# Patient Record
Sex: MALE | Race: WHITE | NOT HISPANIC OR LATINO | Employment: UNEMPLOYED | URBAN - METROPOLITAN AREA
[De-identification: names, ages, dates, MRNs, and addresses within clinical notes are randomized per-mention and may not be internally consistent; named-entity substitution may affect disease eponyms.]

---

## 2021-03-11 ENCOUNTER — HOSPITAL ENCOUNTER (INPATIENT)
Facility: HOSPITAL | Age: 56
LOS: 2 days | Discharge: HOME | End: 2021-03-13
Attending: EMERGENCY MEDICINE | Admitting: FAMILY MEDICINE

## 2021-03-11 ENCOUNTER — APPOINTMENT (OUTPATIENT)
Dept: CT IMAGING | Facility: HOSPITAL | Age: 56
End: 2021-03-11

## 2021-03-11 DIAGNOSIS — K08.89 PAIN, DENTAL: ICD-10-CM

## 2021-03-11 DIAGNOSIS — I16.0 HYPERTENSIVE URGENCY: Primary | ICD-10-CM

## 2021-03-11 DIAGNOSIS — G25.79 SEROTONIN SYNDROME: ICD-10-CM

## 2021-03-11 PROBLEM — F42.9 OCD (OBSESSIVE COMPULSIVE DISORDER): Status: ACTIVE | Noted: 2021-03-11

## 2021-03-11 PROBLEM — I16.9 HYPERTENSIVE CRISIS: Status: ACTIVE | Noted: 2021-03-11

## 2021-03-11 PROBLEM — F41.9 ANXIETY DISORDER: Status: ACTIVE | Noted: 2021-03-11

## 2021-03-11 LAB
ALBUMIN SERPL-MCNC: 4.2 G/DL (ref 3.5–5.2)
ALBUMIN/GLOB SERPL: 1.8 G/DL
ALP SERPL-CCNC: 52 U/L (ref 39–117)
ALT SERPL W P-5'-P-CCNC: 32 U/L (ref 1–41)
ANION GAP SERPL CALCULATED.3IONS-SCNC: 10 MMOL/L (ref 5–15)
AST SERPL-CCNC: 23 U/L (ref 1–40)
BACTERIA UR QL AUTO: ABNORMAL /HPF
BASOPHILS # BLD AUTO: 0.03 10*3/MM3 (ref 0–0.2)
BASOPHILS NFR BLD AUTO: 0.3 % (ref 0–1.5)
BILIRUB SERPL-MCNC: 0.5 MG/DL (ref 0–1.2)
BILIRUB UR QL STRIP: NEGATIVE
BUN SERPL-MCNC: 12 MG/DL (ref 6–20)
BUN/CREAT SERPL: 11.3 (ref 7–25)
CALCIUM SPEC-SCNC: 9.2 MG/DL (ref 8.6–10.5)
CHLORIDE SERPL-SCNC: 104 MMOL/L (ref 98–107)
CLARITY UR: CLEAR
CO2 SERPL-SCNC: 26 MMOL/L (ref 22–29)
COLOR UR: YELLOW
CREAT SERPL-MCNC: 1.06 MG/DL (ref 0.76–1.27)
DEPRECATED RDW RBC AUTO: 41.4 FL (ref 37–54)
EOSINOPHIL # BLD AUTO: 0.03 10*3/MM3 (ref 0–0.4)
EOSINOPHIL NFR BLD AUTO: 0.3 % (ref 0.3–6.2)
ERYTHROCYTE [DISTWIDTH] IN BLOOD BY AUTOMATED COUNT: 13.4 % (ref 12.3–15.4)
FLUAV RNA RESP QL NAA+PROBE: NOT DETECTED
FLUBV RNA RESP QL NAA+PROBE: NOT DETECTED
GFR SERPL CREATININE-BSD FRML MDRD: 73 ML/MIN/1.73
GLOBULIN UR ELPH-MCNC: 2.4 GM/DL
GLUCOSE SERPL-MCNC: 128 MG/DL (ref 65–99)
GLUCOSE UR STRIP-MCNC: NEGATIVE MG/DL
HCT VFR BLD AUTO: 43.6 % (ref 37.5–51)
HGB BLD-MCNC: 15.2 G/DL (ref 13–17.7)
HGB UR QL STRIP.AUTO: ABNORMAL
HYALINE CASTS UR QL AUTO: ABNORMAL /LPF
IMM GRANULOCYTES # BLD AUTO: 0.04 10*3/MM3 (ref 0–0.05)
IMM GRANULOCYTES NFR BLD AUTO: 0.4 % (ref 0–0.5)
KETONES UR QL STRIP: ABNORMAL
LEUKOCYTE ESTERASE UR QL STRIP.AUTO: NEGATIVE
LYMPHOCYTES # BLD AUTO: 1.01 10*3/MM3 (ref 0.7–3.1)
LYMPHOCYTES NFR BLD AUTO: 11.1 % (ref 19.6–45.3)
MCH RBC QN AUTO: 29.6 PG (ref 26.6–33)
MCHC RBC AUTO-ENTMCNC: 34.9 G/DL (ref 31.5–35.7)
MCV RBC AUTO: 84.8 FL (ref 79–97)
MONOCYTES # BLD AUTO: 0.46 10*3/MM3 (ref 0.1–0.9)
MONOCYTES NFR BLD AUTO: 5.1 % (ref 5–12)
NEUTROPHILS NFR BLD AUTO: 7.51 10*3/MM3 (ref 1.7–7)
NEUTROPHILS NFR BLD AUTO: 82.8 % (ref 42.7–76)
NITRITE UR QL STRIP: NEGATIVE
NRBC BLD AUTO-RTO: 0 /100 WBC (ref 0–0.2)
PH UR STRIP.AUTO: 7 [PH] (ref 5–8)
PLATELET # BLD AUTO: 217 10*3/MM3 (ref 140–450)
PMV BLD AUTO: 8.9 FL (ref 6–12)
POTASSIUM SERPL-SCNC: 3.8 MMOL/L (ref 3.5–5.2)
PROT SERPL-MCNC: 6.6 G/DL (ref 6–8.5)
PROT UR QL STRIP: NEGATIVE
RBC # BLD AUTO: 5.14 10*6/MM3 (ref 4.14–5.8)
RBC # UR: ABNORMAL /HPF
REF LAB TEST METHOD: ABNORMAL
SARS-COV-2 RNA RESP QL NAA+PROBE: NOT DETECTED
SODIUM SERPL-SCNC: 140 MMOL/L (ref 136–145)
SP GR UR STRIP: 1.01 (ref 1–1.03)
SQUAMOUS #/AREA URNS HPF: ABNORMAL /HPF
TROPONIN T SERPL-MCNC: <0.01 NG/ML (ref 0–0.03)
TROPONIN T SERPL-MCNC: <0.01 NG/ML (ref 0–0.03)
UROBILINOGEN UR QL STRIP: ABNORMAL
WBC # BLD AUTO: 9.08 10*3/MM3 (ref 3.4–10.8)
WBC UR QL AUTO: ABNORMAL /HPF

## 2021-03-11 PROCEDURE — 87636 SARSCOV2 & INF A&B AMP PRB: CPT | Performed by: PHYSICIAN ASSISTANT

## 2021-03-11 PROCEDURE — 25010000002 ONDANSETRON PER 1 MG: Performed by: PHYSICIAN ASSISTANT

## 2021-03-11 PROCEDURE — 25010000002 HYDROMORPHONE PER 4 MG: Performed by: INTERNAL MEDICINE

## 2021-03-11 PROCEDURE — 99223 1ST HOSP IP/OBS HIGH 75: CPT | Performed by: INTERNAL MEDICINE

## 2021-03-11 PROCEDURE — 81001 URINALYSIS AUTO W/SCOPE: CPT | Performed by: PHYSICIAN ASSISTANT

## 2021-03-11 PROCEDURE — 25010000002 HYDROMORPHONE 1 MG/ML SOLUTION: Performed by: EMERGENCY MEDICINE

## 2021-03-11 PROCEDURE — 84484 ASSAY OF TROPONIN QUANT: CPT | Performed by: PHYSICIAN ASSISTANT

## 2021-03-11 PROCEDURE — 99285 EMERGENCY DEPT VISIT HI MDM: CPT

## 2021-03-11 PROCEDURE — 70486 CT MAXILLOFACIAL W/O DYE: CPT

## 2021-03-11 PROCEDURE — 93005 ELECTROCARDIOGRAM TRACING: CPT | Performed by: PHYSICIAN ASSISTANT

## 2021-03-11 PROCEDURE — 51798 US URINE CAPACITY MEASURE: CPT

## 2021-03-11 PROCEDURE — 85025 COMPLETE CBC W/AUTO DIFF WBC: CPT | Performed by: PHYSICIAN ASSISTANT

## 2021-03-11 PROCEDURE — 80053 COMPREHEN METABOLIC PANEL: CPT | Performed by: PHYSICIAN ASSISTANT

## 2021-03-11 RX ORDER — TAMSULOSIN HYDROCHLORIDE 0.4 MG/1
0.4 CAPSULE ORAL DAILY
Status: DISCONTINUED | OUTPATIENT
Start: 2021-03-11 | End: 2021-03-13 | Stop reason: HOSPADM

## 2021-03-11 RX ORDER — AMLODIPINE BESYLATE 5 MG/1
5 TABLET ORAL
Status: DISCONTINUED | OUTPATIENT
Start: 2021-03-11 | End: 2021-03-11

## 2021-03-11 RX ORDER — AMLODIPINE BESYLATE 10 MG/1
10 TABLET ORAL
Status: DISCONTINUED | OUTPATIENT
Start: 2021-03-12 | End: 2021-03-12

## 2021-03-11 RX ORDER — NALOXONE HCL 0.4 MG/ML
0.4 VIAL (ML) INJECTION
Status: DISCONTINUED | OUTPATIENT
Start: 2021-03-11 | End: 2021-03-12

## 2021-03-11 RX ORDER — SODIUM CHLORIDE 0.9 % (FLUSH) 0.9 %
10 SYRINGE (ML) INJECTION AS NEEDED
Status: DISCONTINUED | OUTPATIENT
Start: 2021-03-11 | End: 2021-03-13 | Stop reason: HOSPADM

## 2021-03-11 RX ORDER — HYDROCODONE BITARTRATE AND ACETAMINOPHEN 5; 325 MG/1; MG/1
1 TABLET ORAL EVERY 4 HOURS PRN
Status: DISCONTINUED | OUTPATIENT
Start: 2021-03-11 | End: 2021-03-13 | Stop reason: HOSPADM

## 2021-03-11 RX ORDER — ONDANSETRON 2 MG/ML
4 INJECTION INTRAMUSCULAR; INTRAVENOUS ONCE
Status: COMPLETED | OUTPATIENT
Start: 2021-03-11 | End: 2021-03-11

## 2021-03-11 RX ORDER — TAMSULOSIN HYDROCHLORIDE 0.4 MG/1
0.4 CAPSULE ORAL ONCE
Status: COMPLETED | OUTPATIENT
Start: 2021-03-11 | End: 2021-03-11

## 2021-03-11 RX ORDER — SODIUM CHLORIDE 0.9 % (FLUSH) 0.9 %
10 SYRINGE (ML) INJECTION EVERY 12 HOURS SCHEDULED
Status: DISCONTINUED | OUTPATIENT
Start: 2021-03-11 | End: 2021-03-13 | Stop reason: HOSPADM

## 2021-03-11 RX ORDER — LORAZEPAM 0.5 MG/1
0.5 TABLET ORAL EVERY 8 HOURS PRN
Status: DISCONTINUED | OUTPATIENT
Start: 2021-03-11 | End: 2021-03-13 | Stop reason: HOSPADM

## 2021-03-11 RX ORDER — HYDROCHLOROTHIAZIDE 12.5 MG/1
12.5 CAPSULE, GELATIN COATED ORAL 2 TIMES DAILY
Status: DISCONTINUED | OUTPATIENT
Start: 2021-03-11 | End: 2021-03-12

## 2021-03-11 RX ORDER — ESCITALOPRAM OXALATE 10 MG/1
10 TABLET ORAL DAILY
COMMUNITY
End: 2021-03-22

## 2021-03-11 RX ORDER — LABETALOL HYDROCHLORIDE 5 MG/ML
10 INJECTION, SOLUTION INTRAVENOUS ONCE
Status: COMPLETED | OUTPATIENT
Start: 2021-03-11 | End: 2021-03-11

## 2021-03-11 RX ORDER — VENLAFAXINE 75 MG/1
75 TABLET ORAL 2 TIMES DAILY
COMMUNITY

## 2021-03-11 RX ORDER — ONDANSETRON 2 MG/ML
4 INJECTION INTRAMUSCULAR; INTRAVENOUS EVERY 6 HOURS PRN
Status: DISCONTINUED | OUTPATIENT
Start: 2021-03-11 | End: 2021-03-13 | Stop reason: HOSPADM

## 2021-03-11 RX ORDER — HYDROMORPHONE HYDROCHLORIDE 1 MG/ML
0.5 INJECTION, SOLUTION INTRAMUSCULAR; INTRAVENOUS; SUBCUTANEOUS
Status: DISCONTINUED | OUTPATIENT
Start: 2021-03-11 | End: 2021-03-12

## 2021-03-11 RX ADMIN — HYDROCODONE BITARTRATE AND ACETAMINOPHEN 1 TABLET: 5; 325 TABLET ORAL at 20:06

## 2021-03-11 RX ADMIN — NICARDIPINE HYDROCHLORIDE 12.5 MG/HR: 0.1 INJECTION, SOLUTION INTRAVENOUS at 20:21

## 2021-03-11 RX ADMIN — SODIUM CHLORIDE, PRESERVATIVE FREE 10 ML: 5 INJECTION INTRAVENOUS at 20:29

## 2021-03-11 RX ADMIN — HYDROMORPHONE HYDROCHLORIDE 1 MG: 1 INJECTION, SOLUTION INTRAMUSCULAR; INTRAVENOUS; SUBCUTANEOUS at 15:24

## 2021-03-11 RX ADMIN — LABETALOL 20 MG/4 ML (5 MG/ML) INTRAVENOUS SYRINGE 10 MG: at 15:24

## 2021-03-11 RX ADMIN — HYDROCHLOROTHIAZIDE 12.5 MG: 12.5 CAPSULE ORAL at 20:28

## 2021-03-11 RX ADMIN — ONDANSETRON 4 MG: 2 INJECTION INTRAMUSCULAR; INTRAVENOUS at 14:22

## 2021-03-11 RX ADMIN — HYDROMORPHONE HYDROCHLORIDE 1 MG: 1 INJECTION, SOLUTION INTRAMUSCULAR; INTRAVENOUS; SUBCUTANEOUS at 14:23

## 2021-03-11 RX ADMIN — NICARDIPINE HYDROCHLORIDE 5 MG/HR: 0.1 INJECTION, SOLUTION INTRAVENOUS at 16:26

## 2021-03-11 RX ADMIN — SODIUM CHLORIDE, PRESERVATIVE FREE 10 ML: 5 INJECTION INTRAVENOUS at 20:28

## 2021-03-11 RX ADMIN — LORAZEPAM 0.5 MG: 0.5 TABLET ORAL at 20:06

## 2021-03-11 RX ADMIN — NICARDIPINE HYDROCHLORIDE 12.5 MG/HR: 0.1 INJECTION, SOLUTION INTRAVENOUS at 22:05

## 2021-03-11 RX ADMIN — HYDROMORPHONE HYDROCHLORIDE 0.5 MG: 1 INJECTION, SOLUTION INTRAMUSCULAR; INTRAVENOUS; SUBCUTANEOUS at 23:25

## 2021-03-11 RX ADMIN — NICARDIPINE HYDROCHLORIDE 5 MG/HR: 0.1 INJECTION, SOLUTION INTRAVENOUS at 23:49

## 2021-03-11 RX ADMIN — TAMSULOSIN HYDROCHLORIDE 0.4 MG: 0.4 CAPSULE ORAL at 16:26

## 2021-03-11 RX ADMIN — HYDROMORPHONE HYDROCHLORIDE 0.5 MG: 1 INJECTION, SOLUTION INTRAMUSCULAR; INTRAVENOUS; SUBCUTANEOUS at 20:06

## 2021-03-11 RX ADMIN — AMLODIPINE BESYLATE 5 MG: 5 TABLET ORAL at 14:22

## 2021-03-11 NOTE — NURSING NOTE
"Pt arrived from ED via WC. A&O x's 4. BP  Elevated. Manual /120. Cardene gtt at 15. Pt asking for pain meds r/t tooth pain also reports he cannot void and has had this problem x's 2 weeks. When ask if he mentioned this in the ED he replied \"yes\". UA has been sent. VS noted. CO pain of 9/10. Educated on use of call bell and need to call for assistance and not get up without calling.Bed locked in low position. Pt needs reinforcement. Continue to monitor.   "

## 2021-03-11 NOTE — ED PROVIDER NOTES
EMERGENCY DEPARTMENT ENCOUNTER    Pt Name: Jesus Soria  MRN: [id number]  Pt :   1965  Room Number:    Date of encounter:  3/11/2021  PCP: Provider, No Known  ED Provider: Timothy Grimm PA-C    Historian: Patient      HPI:  Chief Complaint: Right jaw pain        Context: Jesus Soria is a 55 y.o. male who presents to the ED c/o 1 week history of worsening right upper jaw pain.  Patient has only one molar remaining on that side, and he feels throbbing 8 out of 10 pain underneath this tooth.  No fevers chills sweats or trismus.  He states he normally wears a bridge.  Also now complaining of slight pain to the right lower molar as well.  No neck pain, no difficulty controlling secretions.  No prior history of heart disease.  He was recently started on Lexapro, and has been taking this for 5 days, he states this causes his heart rate to go up and causes him to sweat profusely.  He has continued to take his Effexor while he is taking Lexapro.  He is hypertensive today, and he does have a history of hypertension. He has not taken his Norvasc 5 mg today.  He denies shortness of breath palpitations arm neck shoulder or chest pain.  No mental status changes confusion hallucinations or seizures.  No known history of cardiac disease otherwise.      PAST MEDICAL HISTORY  Active Ambulatory Problems     Diagnosis Date Noted   • No Active Ambulatory Problems     Resolved Ambulatory Problems     Diagnosis Date Noted   • No Resolved Ambulatory Problems     Past Medical History:   Diagnosis Date   • Hypertension    • OCD (obsessive compulsive disorder)          PAST SURGICAL HISTORY  History reviewed. No pertinent surgical history.      FAMILY HISTORY  History reviewed. No pertinent family history.      SOCIAL HISTORY  Social History     Socioeconomic History   • Marital status: Single     Spouse name: Not on file   • Number of children: Not on file   • Years of education: Not on file   • Highest education  level: Not on file   Tobacco Use   • Smoking status: Unknown If Ever Smoked         ALLERGIES  Amoxicillin        REVIEW OF SYSTEMS  Review of Systems   Constitutional: Positive for diaphoresis. Negative for activity change, appetite change, fatigue and fever.   HENT: Positive for dental problem. Negative for congestion, nosebleeds, rhinorrhea and sore throat.    Eyes: Negative for photophobia and visual disturbance.   Respiratory: Negative for cough, shortness of breath and wheezing.    Cardiovascular: Negative for chest pain and palpitations.   Gastrointestinal: Negative for abdominal pain, nausea and vomiting.   Genitourinary: Positive for difficulty urinating. Negative for dysuria and hematuria.   Musculoskeletal: Negative for arthralgias, back pain, myalgias and neck pain.   Neurological: Negative for dizziness, seizures, syncope and headaches.   All other systems reviewed and are negative.    All systems reviewed and negative except for those discussed in HPI.       PHYSICAL EXAM    I have reviewed the triage vital signs and nursing notes.    ED Triage Vitals   Temp Heart Rate Resp BP SpO2   03/11/21 1347 03/11/21 1342 03/11/21 1342 03/11/21 1342 03/11/21 1342   98.1 °F (36.7 °C) 113 20 (!) 238/156 96 %      Temp src Heart Rate Source Patient Position BP Location FiO2 (%)   03/11/21 1347 03/11/21 1342 03/11/21 1342 03/11/21 1342 --   Oral Monitor Sitting Left arm        Physical Exam  GENERAL:   Pale, clammy, diaphoretic, heart rate 120 and regular.  Hypertensive, not in acute distress.  Afebrile, oral temp 98.1 at triage.  HENT: Nares patent.  Mucous membranes are dry.  EYES: No scleral icterus.  CV: Regular rhythm, rate 120  RESPIRATORY: Normal effort.  No audible wheezes, rales or rhonchi.  ABDOMEN: Soft, nontender.  MUSCULOSKELETAL: No deformities.  He is hyperreflexic, but no myoclonus noted.   NEURO: Alert, moves all extremities, follows commands.  Hyperreflexic but no myoclonus.  Slight tremor noted.   Cranial nerves grossly intact, no gross sensorimotor deficits.  Proprioception is normal.  SKIN: Warm, dry, no rash visualized.        LAB RESULTS  Recent Results (from the past 24 hour(s))   CBC Auto Differential    Collection Time: 03/11/21  3:11 PM    Specimen: Blood   Result Value Ref Range    WBC 9.08 3.40 - 10.80 10*3/mm3    RBC 5.14 4.14 - 5.80 10*6/mm3    Hemoglobin 15.2 13.0 - 17.7 g/dL    Hematocrit 43.6 37.5 - 51.0 %    MCV 84.8 79.0 - 97.0 fL    MCH 29.6 26.6 - 33.0 pg    MCHC 34.9 31.5 - 35.7 g/dL    RDW 13.4 12.3 - 15.4 %    RDW-SD 41.4 37.0 - 54.0 fl    MPV 8.9 6.0 - 12.0 fL    Platelets 217 140 - 450 10*3/mm3    Neutrophil % 82.8 (H) 42.7 - 76.0 %    Lymphocyte % 11.1 (L) 19.6 - 45.3 %    Monocyte % 5.1 5.0 - 12.0 %    Eosinophil % 0.3 0.3 - 6.2 %    Basophil % 0.3 0.0 - 1.5 %    Immature Grans % 0.4 0.0 - 0.5 %    Neutrophils, Absolute 7.51 (H) 1.70 - 7.00 10*3/mm3    Lymphocytes, Absolute 1.01 0.70 - 3.10 10*3/mm3    Monocytes, Absolute 0.46 0.10 - 0.90 10*3/mm3    Eosinophils, Absolute 0.03 0.00 - 0.40 10*3/mm3    Basophils, Absolute 0.03 0.00 - 0.20 10*3/mm3    Immature Grans, Absolute 0.04 0.00 - 0.05 10*3/mm3    nRBC 0.0 0.0 - 0.2 /100 WBC   Troponin    Collection Time: 03/11/21  3:11 PM    Specimen: Blood   Result Value Ref Range    Troponin T <0.010 0.000 - 0.030 ng/mL   Comprehensive Metabolic Panel    Collection Time: 03/11/21  3:11 PM    Specimen: Blood   Result Value Ref Range    Glucose 128 (H) 65 - 99 mg/dL    BUN 12 6 - 20 mg/dL    Creatinine 1.06 0.76 - 1.27 mg/dL    Sodium 140 136 - 145 mmol/L    Potassium 3.8 3.5 - 5.2 mmol/L    Chloride 104 98 - 107 mmol/L    CO2 26.0 22.0 - 29.0 mmol/L    Calcium 9.2 8.6 - 10.5 mg/dL    Total Protein 6.6 6.0 - 8.5 g/dL    Albumin 4.20 3.50 - 5.20 g/dL    ALT (SGPT) 32 1 - 41 U/L    AST (SGOT) 23 1 - 40 U/L    Alkaline Phosphatase 52 39 - 117 U/L    Total Bilirubin 0.5 0.0 - 1.2 mg/dL    eGFR Non African Amer 73 >60 mL/min/1.73    Globulin 2.4  gm/dL    A/G Ratio 1.8 g/dL    BUN/Creatinine Ratio 11.3 7.0 - 25.0    Anion Gap 10.0 5.0 - 15.0 mmol/L   Urinalysis With Microscopic If Indicated (No Culture) - Urine, Clean Catch    Collection Time: 03/11/21  4:25 PM    Specimen: Urine, Clean Catch   Result Value Ref Range    Color, UA Yellow Yellow, Straw    Appearance, UA Clear Clear    pH, UA 7.0 5.0 - 8.0    Specific Gravity, UA 1.010 1.005 - 1.030    Glucose, UA Negative Negative    Ketones, UA Trace (A) Negative    Bilirubin, UA Negative Negative    Blood, UA Trace (A) Negative    Protein, UA Negative Negative    Leuk Esterase, UA Negative Negative    Nitrite, UA Negative Negative    Urobilinogen, UA 0.2 E.U./dL 0.2 - 1.0 E.U./dL   Urinalysis, Microscopic Only - Urine, Clean Catch    Collection Time: 03/11/21  4:25 PM    Specimen: Urine, Clean Catch   Result Value Ref Range    RBC, UA 3-6 (A) None Seen, 0-2 /HPF    WBC, UA None Seen None Seen, 0-2 /HPF    Bacteria, UA None Seen None Seen, Trace /HPF    Squamous Epithelial Cells, UA 0-2 None Seen, 0-2 /HPF    Hyaline Casts, UA 0-6 0 - 6 /LPF    Methodology Manual Light Microscopy        If labs were ordered, I independently reviewed the results.        RADIOLOGY  CT Maxillofacial Without Contrast    Result Date: 3/11/2021  EXAMINATION: CT MAXILLOFACIAL WO CONT-  INDICATION: R upper molar dental pain, evaluate for abscess  TECHNIQUE: Axial noncontrast CT of the face with multiplanar reconstruction  The radiation dose reduction device was turned on for each scan per the ALARA (As Low as Reasonably Achievable) protocol.  COMPARISON: NONE  FINDINGS: Limited intracranial evaluation demonstrates no acute findings. The orbits are normal and the paranasal sinuses demonstrates mucous retention cysts in the maxillary sinuses. The superficial facial soft tissues are unremarkable. No acute facial bone fracture. The mastoid air cells are clear. Odontogenic disease is present with areas of caries formation, without  evidence of distinct periapical lucency or adjacent lingual or buccal surface of the odontogenic abscess.      Numerous dental caries are present, without evidence of significant periapical lucency or adjacent odontogenic abscess.   This report was finalized on 3/11/2021 4:18 PM by Mitchel Norman.        See radiologist's dictation for official interpretation.        PROCEDURES    Procedures    ECG 12 Lead         ECG 12 Lead    (Results Pending)       MEDICATIONS GIVEN IN ER    Medications   sodium chloride 0.9 % flush 10 mL (has no administration in time range)   amLODIPine (NORVASC) tablet 5 mg (5 mg Oral Given 3/11/21 1422)   niCARdipine (CARDENE) 20 mg in 200 mL NS infusion (7.5 mg/hr Intravenous Rate/Dose Change 3/11/21 1650)   ondansetron (ZOFRAN) injection 4 mg (4 mg Intravenous Given 3/11/21 1422)   HYDROmorphone (DILAUDID) injection 1 mg (1 mg Intravenous Given 3/11/21 1423)   labetalol (NORMODYNE,TRANDATE) injection 10 mg (10 mg Intravenous Given 3/11/21 1524)   HYDROmorphone (DILAUDID) injection 1 mg (1 mg Intravenous Given 3/11/21 1524)   tamsulosin (FLOMAX) 24 hr capsule 0.4 mg (0.4 mg Oral Given 3/11/21 1626)         PROGRESS, DATA ANALYSIS, CONSULTS, AND MEDICAL DECISION MAKING    All labs have been independently reviewed by me.  All radiology studies have been reviewed by me and the radiologist dictating the report.   EKG's have been independently viewed and interpreted by me.                 Patient has findings consistent with serotonin syndrome.  Discussed with Dr. Maddox who accepts patient to hospitalist service.      AS OF 17:40 EST VITALS:    BP - (!) 221/148  HR - 99  TEMP - 98.1 °F (36.7 °C) (Oral)  O2 SATS - 94%        DIAGNOSIS  Final diagnoses:   Hypertensive urgency   Serotonin syndrome         DISPOSITION  Admit to hospitalist service         Timothy Grimm PA-C  03/11/21 1739       Timothy Grimm PA-C  03/11/21 1740

## 2021-03-11 NOTE — H&P
[facility] Medicine Services  HISTORY AND PHYSICAL    Patient Name: Jesus Soria  : 1965  MRN: [id number]  Primary Care Physician: Obdulio, No Known  Date of admission: 3/11/2021      Subjective   Subjective     Chief Complaint: Toothache, sweats    HPI:  Jesus Soria is a 55 y.o. male with history of poorly controlled hypertension, OCD, anxiety, ongoing right upper dental pain.  Patient presents with worsening pain and sweats.  On arrival here patient significantly hypertensive with /157, also tachycardic, tachypneic and clammy.  Patient does mention that he has been on Effexor for years, but recently started on Lexapro for which he has taken for the last 5 days.  He is to follow-up with dental surgeon at  but he is unable to obtain an earlier appointment.  At the time of my elevation pain is much improved, BP remains high while on Cardene gtt.  He denies any SOA, fevers or chills, no nausea no vomiting, does endorse sweats.    COVID Details: [] No Symptoms  Symptoms: [] Fever []  Cough [] Shortness of breath [] Change in taste or smell  Risks:  [] Direct Exposure [] High risk facility   Date of Onset:  ____  Date of first positive COVID test:     Review of Systems   Gen- No fevers, chills  CV- No chest pain, palpitations  Resp- No cough, dyspnea  GI- No N/V/D, abd pain    All other systems reviewed and are negative.     Personal History     Past Medical History:   Diagnosis Date   • Hypertension    • OCD (obsessive compulsive disorder)        History reviewed. No pertinent surgical history.    Family History: family history is not on file. Otherwise pertinent FHx was reviewed and unremarkable.     Social History:    Social History     Social History Narrative   • Not on file       Medications:  Available home medication information reviewed.  (Not in a hospital admission)      Allergies   Allergen Reactions   • Amoxicillin Hives       Objective   Objective     Vital  Signs:   Temp:  [98.1 °F (36.7 °C)] 98.1 °F (36.7 °C)  Heart Rate:  [] 110  Resp:  [20] 20  BP: (214-239)/(134-178) 223/146       Physical Exam   Constitutional: Chronically ill-appearing male, restless awake, alert  Eyes: PERRLA, sclerae anicteric, no conjunctival injection  HENT: NCAT, mucous membranes moist  Neck: Supple, no thyromegaly, no lymphadenopathy, trachea midline  Respiratory: Clear to auscultation bilaterally, nonlabored respirations   Cardiovascular: RRR, no murmurs, rubs, or gallops, palpable pedal pulses bilaterally  Gastrointestinal: Positive bowel sounds, soft, nontender, nondistended  Musculoskeletal: No bilateral ankle edema, no clubbing or cyanosis to extremities  Psychiatric: Appropriate affect, cooperative  Neurologic: Oriented x 3, strength symmetric in all extremities, Cranial Nerves grossly intact to confrontation, speech clear  Skin: No rashes    Result Review:  I have personally reviewed the results from the time of this admission to 03/11/21 5:41 PM EST and agree with these findings:  []  Laboratory  []  Microbiology  []  Radiology  []  EKG/Telemetry   []  Cardiology/Vascular   []  Pathology  []  Old records  []  Other:  Most notable findings include:       LAB RESULTS:      Lab 03/11/21  1511   WBC 9.08   HEMOGLOBIN 15.2   HEMATOCRIT 43.6   PLATELETS 217   NEUTROS ABS 7.51*   IMMATURE GRANS (ABS) 0.04   LYMPHS ABS 1.01   MONOS ABS 0.46   EOS ABS 0.03   MCV 84.8         Lab 03/11/21  1511   SODIUM 140   POTASSIUM 3.8   CHLORIDE 104   CO2 26.0   ANION GAP 10.0   BUN 12   CREATININE 1.06   GLUCOSE 128*   CALCIUM 9.2         Lab 03/11/21  1511   TOTAL PROTEIN 6.6   ALBUMIN 4.20   GLOBULIN 2.4   ALT (SGPT) 32   AST (SGOT) 23   BILIRUBIN 0.5   ALK PHOS 52         Lab 03/11/21  1511   TROPONIN T <0.010                 Brief Urine Lab Results  (Last result in the past 365 days)      Color   Clarity   Blood   Leuk Est   Nitrite   Protein   CREAT   Urine HCG        03/11/21 1625 Yellow  Clear Trace Negative Negative Negative             Microbiology Results (last 10 days)     ** No results found for the last 240 hours. **          CT Maxillofacial Without Contrast    Result Date: 3/11/2021  EXAMINATION: CT MAXILLOFACIAL WO CONT-  INDICATION: R upper molar dental pain, evaluate for abscess  TECHNIQUE: Axial noncontrast CT of the face with multiplanar reconstruction  The radiation dose reduction device was turned on for each scan per the ALARA (As Low as Reasonably Achievable) protocol.  COMPARISON: NONE  FINDINGS: Limited intracranial evaluation demonstrates no acute findings. The orbits are normal and the paranasal sinuses demonstrates mucous retention cysts in the maxillary sinuses. The superficial facial soft tissues are unremarkable. No acute facial bone fracture. The mastoid air cells are clear. Odontogenic disease is present with areas of caries formation, without evidence of distinct periapical lucency or adjacent lingual or buccal surface of the odontogenic abscess.      Impression: Numerous dental caries are present, without evidence of significant periapical lucency or adjacent odontogenic abscess.   This report was finalized on 3/11/2021 4:18 PM by Mitchel Normna.            Assessment/Plan   Assessment & Plan     Active Hospital Problems    Diagnosis  POA   • Hypertensive crisis [I16.9]  Yes   • OCD (obsessive compulsive disorder) [F42.9]  Yes   • Anxiety disorder [F41.9]  Yes   • Pain, dental [K08.89]  Yes       55-year-old male with history of OCD, anxiety and hypertension, presents with worsening dental pain, admitted with hypertensive crisis    Hypertensive crisis  -BP significantly elevated on presentation, remains elevated and maxed on Cardene gtt.  -Dental pain could be contributing, also patient suspect to be having serotonin syndrome  -Continue Cardene gtt., will start HCTZ, if remains uncontrolled may consider clonidine, control pain  -Follow-up COVID-19 screening  test.    Suspected serotonin syndrome  -Patient has been on Effexor for years, recently started on Lexapro and has been taking both the last 5 days  -We will hold both medications now, start some benzos, continue supportive therapy    OCD, anxiety disorder  -As needed benzos    Dental pain  -Patient is due to see dental surgeon at , unable to get an appointment yet  -Continue controlpain    BPH-continue Flomax    DVT prophylaxis: Mechanical      CODE STATUS: Full  There are no questions and answers to display.       Admission Status:  I believe this patient meets INPATIENT status due to hypertensive crisis.  I feel patient’s risk for adverse outcomes and need for care warrant INPATIENT evaluation and I predict the patient’s care encounter to likely last beyond 2 midnights.    Lindsay Maddox MD  03/11/21

## 2021-03-12 PROBLEM — F10.20 ALCOHOLISM (HCC): Status: ACTIVE | Noted: 2021-03-12

## 2021-03-12 LAB
GLUCOSE BLDC GLUCOMTR-MCNC: 106 MG/DL (ref 70–130)
GLUCOSE BLDC GLUCOMTR-MCNC: 133 MG/DL (ref 70–130)

## 2021-03-12 PROCEDURE — 99232 SBSQ HOSP IP/OBS MODERATE 35: CPT | Performed by: FAMILY MEDICINE

## 2021-03-12 PROCEDURE — 82962 GLUCOSE BLOOD TEST: CPT

## 2021-03-12 PROCEDURE — 25010000002 LORAZEPAM PER 2 MG: Performed by: INTERNAL MEDICINE

## 2021-03-12 RX ORDER — FOLIC ACID 1 MG/1
1 TABLET ORAL DAILY
Status: DISCONTINUED | OUTPATIENT
Start: 2021-03-12 | End: 2021-03-13 | Stop reason: HOSPADM

## 2021-03-12 RX ORDER — L.ACID,PARA/B.BIFIDUM/S.THERM 8B CELL
1 CAPSULE ORAL
Status: DISCONTINUED | OUTPATIENT
Start: 2021-03-12 | End: 2021-03-13 | Stop reason: HOSPADM

## 2021-03-12 RX ORDER — HYDRALAZINE HYDROCHLORIDE 25 MG/1
25 TABLET, FILM COATED ORAL 3 TIMES DAILY PRN
Status: DISCONTINUED | OUTPATIENT
Start: 2021-03-12 | End: 2021-03-13 | Stop reason: HOSPADM

## 2021-03-12 RX ORDER — NIFEDIPINE 10 MG/1
10 CAPSULE ORAL EVERY 8 HOURS SCHEDULED
Status: DISCONTINUED | OUTPATIENT
Start: 2021-03-12 | End: 2021-03-13 | Stop reason: HOSPADM

## 2021-03-12 RX ORDER — LORAZEPAM 2 MG/ML
2 INJECTION INTRAMUSCULAR
Status: DISCONTINUED | OUTPATIENT
Start: 2021-03-12 | End: 2021-03-13 | Stop reason: HOSPADM

## 2021-03-12 RX ORDER — LORAZEPAM 1 MG/1
2 TABLET ORAL
Status: DISCONTINUED | OUTPATIENT
Start: 2021-03-12 | End: 2021-03-13 | Stop reason: HOSPADM

## 2021-03-12 RX ORDER — ERGOCALCIFEROL (VITAMIN D2) 10 MCG
400 TABLET ORAL DAILY
COMMUNITY

## 2021-03-12 RX ORDER — CHOLECALCIFEROL (VITAMIN D3) 125 MCG
500 CAPSULE ORAL DAILY
COMMUNITY

## 2021-03-12 RX ORDER — AMOXICILLIN AND CLAVULANATE POTASSIUM 875; 125 MG/1; MG/1
1 TABLET, FILM COATED ORAL EVERY 12 HOURS SCHEDULED
Status: DISCONTINUED | OUTPATIENT
Start: 2021-03-12 | End: 2021-03-12

## 2021-03-12 RX ORDER — LORAZEPAM 2 MG/ML
4 INJECTION INTRAMUSCULAR
Status: DISCONTINUED | OUTPATIENT
Start: 2021-03-12 | End: 2021-03-13 | Stop reason: HOSPADM

## 2021-03-12 RX ORDER — AZITHROMYCIN 250 MG/1
250 TABLET, FILM COATED ORAL
Status: DISCONTINUED | OUTPATIENT
Start: 2021-03-13 | End: 2021-03-13 | Stop reason: HOSPADM

## 2021-03-12 RX ORDER — LISINOPRIL 20 MG/1
20 TABLET ORAL
Status: DISCONTINUED | OUTPATIENT
Start: 2021-03-12 | End: 2021-03-13 | Stop reason: HOSPADM

## 2021-03-12 RX ORDER — PROPRANOLOL HYDROCHLORIDE 10 MG/1
20 TABLET ORAL 3 TIMES DAILY
Status: DISCONTINUED | OUTPATIENT
Start: 2021-03-12 | End: 2021-03-13 | Stop reason: HOSPADM

## 2021-03-12 RX ORDER — MIRTAZAPINE 15 MG/1
15 TABLET, FILM COATED ORAL NIGHTLY
Status: DISCONTINUED | OUTPATIENT
Start: 2021-03-12 | End: 2021-03-13 | Stop reason: HOSPADM

## 2021-03-12 RX ORDER — MIRTAZAPINE 15 MG/1
15 TABLET, FILM COATED ORAL NIGHTLY
COMMUNITY

## 2021-03-12 RX ORDER — AZITHROMYCIN 250 MG/1
500 TABLET, FILM COATED ORAL ONCE
Status: COMPLETED | OUTPATIENT
Start: 2021-03-12 | End: 2021-03-12

## 2021-03-12 RX ORDER — LORAZEPAM 2 MG/ML
1 INJECTION INTRAMUSCULAR
Status: DISCONTINUED | OUTPATIENT
Start: 2021-03-12 | End: 2021-03-13 | Stop reason: HOSPADM

## 2021-03-12 RX ORDER — LORAZEPAM 1 MG/1
4 TABLET ORAL
Status: DISCONTINUED | OUTPATIENT
Start: 2021-03-12 | End: 2021-03-13 | Stop reason: HOSPADM

## 2021-03-12 RX ORDER — HYDROCHLOROTHIAZIDE 25 MG/1
25 TABLET ORAL DAILY
Status: DISCONTINUED | OUTPATIENT
Start: 2021-03-12 | End: 2021-03-13 | Stop reason: HOSPADM

## 2021-03-12 RX ORDER — PROPRANOLOL HYDROCHLORIDE 20 MG/1
20 TABLET ORAL 3 TIMES DAILY
COMMUNITY
End: 2021-03-23

## 2021-03-12 RX ORDER — LORAZEPAM 1 MG/1
1 TABLET ORAL
Status: DISCONTINUED | OUTPATIENT
Start: 2021-03-12 | End: 2021-03-13 | Stop reason: HOSPADM

## 2021-03-12 RX ORDER — HYDROCHLOROTHIAZIDE 25 MG/1
25 TABLET ORAL DAILY
Status: DISCONTINUED | OUTPATIENT
Start: 2021-03-13 | End: 2021-03-12

## 2021-03-12 RX ADMIN — MIRTAZAPINE 15 MG: 15 TABLET, FILM COATED ORAL at 20:35

## 2021-03-12 RX ADMIN — LISINOPRIL 20 MG: 20 TABLET ORAL at 12:03

## 2021-03-12 RX ADMIN — TAMSULOSIN HYDROCHLORIDE 0.4 MG: 0.4 CAPSULE ORAL at 12:03

## 2021-03-12 RX ADMIN — THIAMINE HCL TAB 100 MG 100 MG: 100 TAB at 12:03

## 2021-03-12 RX ADMIN — HYDROCHLOROTHIAZIDE 25 MG: 25 TABLET ORAL at 12:04

## 2021-03-12 RX ADMIN — SODIUM CHLORIDE, PRESERVATIVE FREE 10 ML: 5 INJECTION INTRAVENOUS at 20:36

## 2021-03-12 RX ADMIN — NIFEDIPINE 10 MG: 10 CAPSULE ORAL at 15:35

## 2021-03-12 RX ADMIN — LORAZEPAM 2 MG: 2 INJECTION INTRAMUSCULAR; INTRAVENOUS at 02:56

## 2021-03-12 RX ADMIN — NICARDIPINE HYDROCHLORIDE 5 MG/HR: 0.1 INJECTION, SOLUTION INTRAVENOUS at 08:29

## 2021-03-12 RX ADMIN — LORAZEPAM 1 MG: 2 INJECTION INTRAMUSCULAR; INTRAVENOUS at 06:12

## 2021-03-12 RX ADMIN — FOLIC ACID 1 MG: 1 TABLET ORAL at 12:03

## 2021-03-12 RX ADMIN — NICARDIPINE HYDROCHLORIDE 5 MG/HR: 0.1 INJECTION, SOLUTION INTRAVENOUS at 03:46

## 2021-03-12 RX ADMIN — PROPRANOLOL HYDROCHLORIDE 20 MG: 10 TABLET ORAL at 12:03

## 2021-03-12 RX ADMIN — NIFEDIPINE 10 MG: 10 CAPSULE ORAL at 21:11

## 2021-03-12 RX ADMIN — HYDROCODONE BITARTRATE AND ACETAMINOPHEN 1 TABLET: 5; 325 TABLET ORAL at 20:36

## 2021-03-12 RX ADMIN — AZITHROMYCIN MONOHYDRATE 500 MG: 250 TABLET ORAL at 18:46

## 2021-03-12 RX ADMIN — Medication 1 CAPSULE: at 18:46

## 2021-03-12 RX ADMIN — LORAZEPAM 1 MG: 2 INJECTION INTRAMUSCULAR; INTRAVENOUS at 15:35

## 2021-03-12 RX ADMIN — PROPRANOLOL HYDROCHLORIDE 20 MG: 10 TABLET ORAL at 20:36

## 2021-03-12 RX ADMIN — SODIUM CHLORIDE, PRESERVATIVE FREE 10 ML: 5 INJECTION INTRAVENOUS at 12:04

## 2021-03-12 RX ADMIN — LORAZEPAM 1 MG: 2 INJECTION INTRAMUSCULAR; INTRAVENOUS at 12:04

## 2021-03-12 NOTE — PLAN OF CARE
Goal Outcome Evaluation:   BP controlled with Cardene drip at 5mg/hr. Sinus tach to NSR per monitor. O2 stable on room air to 2L nasal cannula. Patient A/Ox4. CIWA 4-14, ativan administered x2. Frequent complaints of mouth/tooth pain, PRN administered. Patient reports last drink was 3 days ago and that he drinks at least 3 shots of vodka 5 days a week. Some behaviors/comments made by the patient inappropriate to situation. Patient is now resting and has relief from anxiety/agitation.

## 2021-03-12 NOTE — PROGRESS NOTES
[facility] Medicine Services  PROGRESS NOTE    Patient Name: Jesus Soria  : 1965  MRN: [id number]    Date of Admission: 3/11/2021  Primary Care Physician: Provider, No Known    Subjective   Subjective     CC:  Hypertension    HPI:  Patient is a 55-year-old who was admitted with hypertensive crisis as well as dental caries with abscess.  Is also noted to have alcohol abuse disorder.  He was initially started on a Cardene drip which has been discontinued.  He is tolerating p.o. well.  Per nursing he has slept most of the day.  No new concerns.  He denies chest pain.  He is on 2 L nasal cannula to maintain saturations greater than 90 while sleeping.    ROS:  General: No fever, chills, positive fatigue  ENT: No sore throat, trouble swallowing or changes in vision  Respiratory: No shortness of breath, cough, wheezing or fast breathing  Cardiovascular: No chest pain, palpitations, dyspnea with exertion  Gastrointestinal: No nausea vomiting abdominal pain  Musculoskeletal: No difficulty walking, no weakness  Vascular: No cyanosis or clubbing  Lymphatic: No peripheral edema, no lymphadenopathy  Neurologic: No headache, confusion, dizziness  Psychiatric: No changes in mood.  No depression or anxiety.       Objective   Objective     Vital Signs:   Temp:  [97.5 °F (36.4 °C)-98.2 °F (36.8 °C)] 98.1 °F (36.7 °C)  Heart Rate:  [] 84  Resp:  [18-20] 18  BP: (147-232)/() 169/107        Physical Exam:  Constitutional: No acute distress, awake, alert, nontoxic, normal body habitus  Eyes: Pupils equal, sclerae anicteric, no conjunctival injection  HENT: Face is swollen due to dental caries/possible abscess, mucous membranes moist  Neck: Supple, no thyromegaly, no lymphadenopathy  Respiratory: Clear to auscultation bilaterally, good effort, nonlabored respirations   Cardiovascular: RRR  Gastrointestinal: Positive bowel sounds, soft, nontender, nondistended  Musculoskeletal: No peripheral  edema, normal muscle tone for age  Psychiatric: Appropriate affect, good insight and judgement, cooperative  Neurologic: Oriented x 3, movements symmetric BUE and BLE, Cranial Nerves grossly intact, speech clear and fluent  Skin: No rashes, no jaundice, no petechiae, no mottling      Results Reviewed:  Results from last 7 days   Lab Units 03/11/21  1511   WBC 10*3/mm3 9.08   HEMOGLOBIN g/dL 15.2   HEMATOCRIT % 43.6   PLATELETS 10*3/mm3 217     Results from last 7 days   Lab Units 03/11/21 1804 03/11/21  1511   SODIUM mmol/L  --  140   POTASSIUM mmol/L  --  3.8   CHLORIDE mmol/L  --  104   CO2 mmol/L  --  26.0   BUN mg/dL  --  12   CREATININE mg/dL  --  1.06   GLUCOSE mg/dL  --  128*   CALCIUM mg/dL  --  9.2   ALT (SGPT) U/L  --  32   AST (SGOT) U/L  --  23   TROPONIN T ng/mL <0.010 <0.010     Estimated Creatinine Clearance: 94.3 mL/min (by C-G formula based on SCr of 1.06 mg/dL).    Microbiology Results Abnormal     Procedure Component Value - Date/Time    COVID PRE-OP / PRE-PROCEDURE SCREENING ORDER (NO ISOLATION) - Swab, Nasopharynx [[id number]]  (Normal) Collected: 03/11/21 1804    Lab Status: Final result Specimen: Swab from Nasopharynx Updated: 03/11/21 1835    Narrative:      The following orders were created for panel order COVID PRE-OP / PRE-PROCEDURE SCREENING ORDER (NO ISOLATION) - Swab, Nasopharynx.  Procedure                               Abnormality         Status                     ---------                               -----------         ------                     COVID-19 and FLU A/B PCR...[[id number]]  Normal              Final result                 Please view results for these tests on the individual orders.    COVID-19 and FLU A/B PCR - Swab, Nasopharynx [[id number]]  (Normal) Collected: 03/11/21 1804    Lab Status: Final result Specimen: Swab from Nasopharynx Updated: 03/11/21 1835     COVID19 Not Detected     Influenza A PCR Not Detected     Influenza B PCR Not Detected    Narrative:       Fact sheet for providers: https://www.fda.gov/media/999118/download    Fact sheet for patients: https://www.fda.gov/media/082442/download    Test performed by PCR.          Imaging Results (Last 24 Hours)     ** No results found for the last 24 hours. **              I have reviewed the medications:  Scheduled Meds:amoxicillin-clavulanate, 1 tablet, Oral, Q12H  folic acid, 1 mg, Oral, Daily  hydroCHLOROthiazide Oral, 25 mg, Oral, Daily  lisinopril, 20 mg, Oral, Q24H  mirtazapine, 15 mg, Oral, Nightly  NIFEdipine, 10 mg, Oral, Q8H  propranolol, 20 mg, Oral, TID  sodium chloride, 10 mL, Intravenous, Q12H  tamsulosin, 0.4 mg, Oral, Daily  thiamine, 100 mg, Oral, Daily      Continuous Infusions:   PRN Meds:.hydrALAZINE  •  HYDROcodone-acetaminophen  •  LORazepam **OR** LORazepam **OR** LORazepam **OR** LORazepam **OR** LORazepam **OR** LORazepam **OR** LORazepam **OR** LORazepam  •  LORazepam  •  ondansetron  •  [COMPLETED] Insert peripheral IV **AND** sodium chloride  •  sodium chloride    Assessment/Plan   Assessment & Plan     Active Hospital Problems    Diagnosis  POA   • **Hypertensive crisis [I16.9]  Yes   • Alcoholism (CMS/HCC) [F10.20]  Yes   • OCD (obsessive compulsive disorder) [F42.9]  Yes   • Anxiety disorder [F41.9]  Yes   • Pain, dental [K08.89]  Yes      Resolved Hospital Problems   No resolved problems to display.        Brief Hospital Course to date:  Jesus Soria is a 55 y.o. male admitted with hypertensive crisis.    Hypertensive crisis  -Initially treated with Cardene drip  -Adjusted oral medications and stopped the drip today.  -Patient denies any chest pain or headache    Dental caries/abscess  -Patient has an appointment on Monday with a dentist for referral  -Started Augmentin in the interim  -Started probiotic while on antibiotics    Alcohol abuse  Anxiety disorder  -Patient states he has never had a withdrawal from alcohol  -Patient states he frequently goes several days without drinking and  does not have any problems  -Initiated CIWA protocol  -Continue Ativan as needed    Likely sleep apnea  Transient hypoxia  -Continue O2 to maintain sats greater than 90, currently on 2 L while sleeping    DVT Prophylaxis: Mechanical      Disposition: I expect the patient to be discharged pending improvement stabilization of his blood pressure and pain, possibly home tomorrow.    CODE STATUS:   Code Status and Medical Interventions:   Ordered at: 03/11/21 1829     Level Of Support Discussed With:    Patient     Code Status:    CPR     Medical Interventions (Level of Support Prior to Arrest):    Full       Jeanna Anton MD  03/12/21

## 2021-03-12 NOTE — PAYOR COMM NOTE
"Scott Soria (55 y.o. Male)     Date of Birth Social Security Number Address Home Phone MRN    1965  [address]  [address] [place] [address]  [id number]    Pent[employer]ostal Marital Status          Jainism Single       Admission Date Admission Type Admitting Provider Attending Provider Department, Room/Bed    3/11/21 Emergency Jeanna Anton MD Stephen, Karla R, MD [facility] 3F, S327/1    Discharge Date Discharge Disposition Discharge Destination                       Attending Provider: Jeanna Anton MD    Allergies: Amoxicillin    Isolation: None   Infection: None   Code Status: CPR    Ht: 177.8 cm (70\")   Wt: 99.8 kg (220 lb)    Admission Cmt: None   Principal Problem: None                Active Insurance as of 3/11/2021     Primary Coverage     Payor Plan Insurance Group Employer/Plan Group    [facility] [employer]      Payor Plan Address Payor Plan Phone Number Payor Plan Fax Number Effective Dates    [address] [address] [phone]  3/11/2021 - None Entered    [facility] [id number]       Subscriber Name Subscriber Birth Date Member ID       SCOTT SORIA 1965 [id number]                 Emergency Contacts      (Rel.) Home Phone Work Phone Mobile Phone    SHANIQUE SORIA (Mother) -- -- [phone]            Insurance Information                [facility]/[employer] Phone: [phone]    Subscriber: Scott Soria Subscriber#: [id number]    Group#:  Precert#:              History & Physical      Lindsay Maddox MD at [address] [place] [address] Medicine Services  HISTORY AND PHYSICAL    Patient Name: Scott Soria  : 1965  MRN: [id number]  Primary Care Physician: Provider, No Known  Date of admission: 3/11/2021      Subjective   Subjective     Chief Complaint: Toothache, sweats    HPI:  Scott Soria is a 55 y.o. male with history of poorly controlled hypertension, OCD, anxiety, ongoing right " upper dental pain.  Patient presents with worsening pain and sweats.  On arrival here patient significantly hypertensive with /157, also tachycardic, tachypneic and clammy.  Patient does mention that he has been on Effexor for years, but recently started on Lexapro for which he has taken for the last 5 days.  He is to follow-up with dental surgeon at  but he is unable to obtain an earlier appointment.  At the time of my elevation pain is much improved, BP remains high while on Cardene gtt.  He denies any SOA, fevers or chills, no nausea no vomiting, does endorse sweats.    COVID Details: [] No Symptoms  Symptoms: [] Fever []  Cough [] Shortness of breath [] Change in taste or smell  Risks:  [] Direct Exposure [] High risk facility   Date of Onset:  ____  Date of first positive COVID test:     Review of Systems   Gen- No fevers, chills  CV- No chest pain, palpitations  Resp- No cough, dyspnea  GI- No N/V/D, abd pain    All other systems reviewed and are negative.     Personal History     Past Medical History:   Diagnosis Date   • Hypertension    • OCD (obsessive compulsive disorder)        History reviewed. No pertinent surgical history.    Family History: family history is not on file. Otherwise pertinent FHx was reviewed and unremarkable.     Social History:    Social History     Social History Narrative   • Not on file       Medications:  Available home medication information reviewed.  (Not in a hospital admission)      Allergies   Allergen Reactions   • Amoxicillin Hives       Objective   Objective     Vital Signs:   Temp:  [98.1 °F (36.7 °C)] 98.1 °F (36.7 °C)  Heart Rate:  [] 110  Resp:  [20] 20  BP: (214-239)/(134-178) 223/146       Physical Exam   Constitutional: Chronically ill-appearing male, restless awake, alert  Eyes: PERRLA, sclerae anicteric, no conjunctival injection  HENT: NCAT, mucous membranes moist  Neck: Supple, no thyromegaly, no lymphadenopathy, trachea midline  Respiratory:  Clear to auscultation bilaterally, nonlabored respirations   Cardiovascular: RRR, no murmurs, rubs, or gallops, palpable pedal pulses bilaterally  Gastrointestinal: Positive bowel sounds, soft, nontender, nondistended  Musculoskeletal: No bilateral ankle edema, no clubbing or cyanosis to extremities  Psychiatric: Appropriate affect, cooperative  Neurologic: Oriented x 3, strength symmetric in all extremities, Cranial Nerves grossly intact to confrontation, speech clear  Skin: No rashes    Result Review:  I have personally reviewed the results from the time of this admission to 03/11/21 5:41 PM EST and agree with these findings:  []  Laboratory  []  Microbiology  []  Radiology  []  EKG/Telemetry   []  Cardiology/Vascular   []  Pathology  []  Old records  []  Other:  Most notable findings include:       LAB RESULTS:      Lab 03/11/21  1511   WBC 9.08   HEMOGLOBIN 15.2   HEMATOCRIT 43.6   PLATELETS 217   NEUTROS ABS 7.51*   IMMATURE GRANS (ABS) 0.04   LYMPHS ABS 1.01   MONOS ABS 0.46   EOS ABS 0.03   MCV 84.8         Lab 03/11/21  1511   SODIUM 140   POTASSIUM 3.8   CHLORIDE 104   CO2 26.0   ANION GAP 10.0   BUN 12   CREATININE 1.06   GLUCOSE 128*   CALCIUM 9.2         Lab 03/11/21  1511   TOTAL PROTEIN 6.6   ALBUMIN 4.20   GLOBULIN 2.4   ALT (SGPT) 32   AST (SGOT) 23   BILIRUBIN 0.5   ALK PHOS 52         Lab 03/11/21  1511   TROPONIN T <0.010                 Brief Urine Lab Results  (Last result in the past 365 days)      Color   Clarity   Blood   Leuk Est   Nitrite   Protein   CREAT   Urine HCG        03/11/21 1625 Yellow Clear Trace Negative Negative Negative             Microbiology Results (last 10 days)     ** No results found for the last 240 hours. **          CT Maxillofacial Without Contrast    Result Date: 3/11/2021  EXAMINATION: CT MAXILLOFACIAL WO CONT-  INDICATION: R upper molar dental pain, evaluate for abscess  TECHNIQUE: Axial noncontrast CT of the face with multiplanar reconstruction  The radiation  dose reduction device was turned on for each scan per the ALARA (As Low as Reasonably Achievable) protocol.  COMPARISON: NONE  FINDINGS: Limited intracranial evaluation demonstrates no acute findings. The orbits are normal and the paranasal sinuses demonstrates mucous retention cysts in the maxillary sinuses. The superficial facial soft tissues are unremarkable. No acute facial bone fracture. The mastoid air cells are clear. Odontogenic disease is present with areas of caries formation, without evidence of distinct periapical lucency or adjacent lingual or buccal surface of the odontogenic abscess.      Impression: Numerous dental caries are present, without evidence of significant periapical lucency or adjacent odontogenic abscess.   This report was finalized on 3/11/2021 4:18 PM by Mitchel Norman.            Assessment/Plan   Assessment & Plan     Active Hospital Problems    Diagnosis  POA   • Hypertensive crisis [I16.9]  Yes   • OCD (obsessive compulsive disorder) [F42.9]  Yes   • Anxiety disorder [F41.9]  Yes   • Pain, dental [K08.89]  Yes       55-year-old male with history of OCD, anxiety and hypertension, presents with worsening dental pain, admitted with hypertensive crisis    Hypertensive crisis  -BP significantly elevated on presentation, remains elevated and maxed on Cardene gtt.  -Dental pain could be contributing, also patient suspect to be having serotonin syndrome  -Continue Cardene gtt., will start HCTZ, if remains uncontrolled may consider clonidine, control pain  -Follow-up COVID-19 screening test.    Suspected serotonin syndrome  -Patient has been on Effexor for years, recently started on Lexapro and has been taking both the last 5 days  -We will hold both medications now, start some benzos, continue supportive therapy    OCD, anxiety disorder  -As needed benzos    Dental pain  -Patient is due to see dental surgeon at , unable to get an appointment yet  -Continue controlpain    BPH-continue  Flomax    DVT prophylaxis: Mechanical      CODE STATUS: Full  There are no questions and answers to display.       Admission Status:  I believe this patient meets INPATIENT status due to hypertensive crisis.  I feel patient’s risk for adverse outcomes and need for care warrant INPATIENT evaluation and I predict the patient’s care encounter to likely last beyond 2 midnights.    Lindsay Maddox MD  03/11/21    Electronically signed by Lindsay Maddox MD at 03/11/21 1830         Lab Results (last 24 hours)     Procedure Component Value Units Date/Time    POC Glucose Once [[id number]]  (Abnormal) Collected: 03/12/21 0837    Specimen: Blood Updated: 03/12/21 0838     Glucose 133 mg/dL     COVID PRE-OP / PRE-PROCEDURE SCREENING ORDER (NO ISOLATION) - Swab, Nasopharynx [[id number]]  (Normal) Collected: 03/11/21 1804    Specimen: Swab from Nasopharynx Updated: 03/11/21 1835    Narrative:      The following orders were created for panel order COVID PRE-OP / PRE-PROCEDURE SCREENING ORDER (NO ISOLATION) - Swab, Nasopharynx.  Procedure                               Abnormality         Status                     ---------                               -----------         ------                     COVID-19 and FLU A/B PCR...[[id number]]  Normal              Final result                 Please view results for these tests on the individual orders.    COVID-19 and FLU A/B PCR - Swab, Nasopharynx [[id number]]  (Normal) Collected: 03/11/21 1804    Specimen: Swab from Nasopharynx Updated: 03/11/21 1835     COVID19 Not Detected     Influenza A PCR Not Detected     Influenza B PCR Not Detected    Narrative:      Fact sheet for providers: https://www.fda.gov/media/268978/download    Fact sheet for patients: https://www.fda.gov/media/505477/download    Test performed by PCR.    Troponin [[id number]]  (Normal) Collected: 03/11/21 1804    Specimen: Blood Updated: 03/11/21 1831     Troponin T <0.010 ng/mL     Narrative:      Troponin T Reference  Range:  <= 0.03 ng/mL-   Negative for AMI  >0.03 ng/mL-     Abnormal for myocardial necrosis.  Clinicians would have to utilize clinical acumen, EKG, Troponin and serial changes to determine if it is an Acute Myocardial Infarction or myocardial injury due to an underlying chronic condition.       Results may be falsely decreased if patient taking Biotin.          Imaging Results (Last 24 Hours)     ** No results found for the last 24 hours. **        Physician Progress Notes (last 24 hours) (Notes from 03/11/21 1207 through 03/12/21 1207)    No notes of this type exist for this encounter.         Consult Notes (last 24 hours) (Notes from 03/11/21 1207 through 03/12/21 1207)    No notes of this type exist for this encounter.       Nicole Paredes PA-C   Physician Assistant   Medicine   Significant Note   Cosign Needed   Date of Service:  03/12/21 0048   Creation Time:  03/12/21 0048            Cosign Needed             Patient admitted with hypertensive crisis. Now admits to alcoholism. States he drinks at least 3 shots a day. He is diaphoretic and tremulous. Will start ciwa protocol

## 2021-03-12 NOTE — SIGNIFICANT NOTE
Patient admitted with hypertensive crisis. Now admits to alcoholism. States he drinks at least 3 shots a day. He is diaphoretic and tremulous. Will start ciwa protocol

## 2021-03-12 NOTE — PROGRESS NOTES
Discharge Planning Assessment  [facility]     Patient Name: Jesus Soria  MRN: [id number]  Today's Date: 3/12/2021    Admit Date: 3/11/2021    Discharge Needs Assessment     Row Name 03/12/21 1546       Living Environment    Lives With  alone    Current Living Arrangements  home/apartment/condo    Primary Care Provided by  self    Provides Primary Care For  no one    Family Caregiver if Needed  parent(s)    Family Caregiver Names  mother Soila    Quality of Family Relationships  unable to assess    Able to Return to Prior Arrangements  yes       Resource/Environmental Concerns    Resource/Environmental Concerns  none       Transition Planning    Patient/Family Anticipates Transition to  home    Patient/Family Anticipated Services at Transition  none    Transportation Anticipated  family or friend will provide       Discharge Needs Assessment    Readmission Within the Last 30 Days  no previous admission in last 30 days    Equipment Currently Used at Home  none    Concerns to be Addressed  no discharge needs identified    Equipment Needed After Discharge  none    Current Discharge Risk  lives alone        Discharge Plan     Row Name 03/12/21 1546       Plan    Plan  Home    Plan Comments  I spoke briefly with patient this am. He reports being independent with ADL's at home. Not using any DME or home service.  is following. No current discharge planning needs identified    Final Discharge Disposition Code  01 - home or self-care        Continued Care and Services - Admitted Since 3/11/2021    Coordination has not been started for this encounter.         Demographic Summary     Row Name 03/12/21 1545       General Information    Admission Type  inpatient    Preferred Language  English    General Information Comments  [place] at [facility], unsure name of provider       Contact Information    Permission Granted to Share Info With          Functional Status     Row Name 03/12/21 1545        Functional Status    Usual Activity Tolerance  excellent       Functional Status, IADL    Medications  independent    Meal Preparation  independent    Housekeeping  independent    Laundry  independent    Shopping  independent       Employment/    Employment/ Comments  Patient has [employer] [place] insurance and denies concerns regarding coverage or disruption in coverage issues. Patient has drug coverage and denies issues obtaining or affording current medications. .        Psychosocial    No documentation.       Abuse/Neglect    No documentation.       Legal    No documentation.       Substance Abuse    No documentation.       Patient Forms    No documentation.           Jessica Jones RN

## 2021-03-13 VITALS
OXYGEN SATURATION: 94 % | HEIGHT: 70 IN | WEIGHT: 220 LBS | SYSTOLIC BLOOD PRESSURE: 163 MMHG | HEART RATE: 68 BPM | TEMPERATURE: 98.1 F | RESPIRATION RATE: 16 BRPM | DIASTOLIC BLOOD PRESSURE: 99 MMHG | BODY MASS INDEX: 31.5 KG/M2

## 2021-03-13 LAB
ANION GAP SERPL CALCULATED.3IONS-SCNC: 10 MMOL/L (ref 5–15)
BASOPHILS # BLD AUTO: 0.04 10*3/MM3 (ref 0–0.2)
BASOPHILS NFR BLD AUTO: 0.4 % (ref 0–1.5)
BUN SERPL-MCNC: 14 MG/DL (ref 6–20)
BUN/CREAT SERPL: 11.2 (ref 7–25)
CALCIUM SPEC-SCNC: 9.3 MG/DL (ref 8.6–10.5)
CHLORIDE SERPL-SCNC: 101 MMOL/L (ref 98–107)
CO2 SERPL-SCNC: 27 MMOL/L (ref 22–29)
CREAT SERPL-MCNC: 1.25 MG/DL (ref 0.76–1.27)
DEPRECATED RDW RBC AUTO: 43 FL (ref 37–54)
EOSINOPHIL # BLD AUTO: 0.13 10*3/MM3 (ref 0–0.4)
EOSINOPHIL NFR BLD AUTO: 1.4 % (ref 0.3–6.2)
ERYTHROCYTE [DISTWIDTH] IN BLOOD BY AUTOMATED COUNT: 13.8 % (ref 12.3–15.4)
GFR SERPL CREATININE-BSD FRML MDRD: 60 ML/MIN/1.73
GLUCOSE SERPL-MCNC: 104 MG/DL (ref 65–99)
HCT VFR BLD AUTO: 43.9 % (ref 37.5–51)
HGB BLD-MCNC: 14.8 G/DL (ref 13–17.7)
IMM GRANULOCYTES # BLD AUTO: 0.03 10*3/MM3 (ref 0–0.05)
IMM GRANULOCYTES NFR BLD AUTO: 0.3 % (ref 0–0.5)
LYMPHOCYTES # BLD AUTO: 1.41 10*3/MM3 (ref 0.7–3.1)
LYMPHOCYTES NFR BLD AUTO: 15.2 % (ref 19.6–45.3)
MCH RBC QN AUTO: 29.1 PG (ref 26.6–33)
MCHC RBC AUTO-ENTMCNC: 33.7 G/DL (ref 31.5–35.7)
MCV RBC AUTO: 86.4 FL (ref 79–97)
MONOCYTES # BLD AUTO: 0.7 10*3/MM3 (ref 0.1–0.9)
MONOCYTES NFR BLD AUTO: 7.6 % (ref 5–12)
NEUTROPHILS NFR BLD AUTO: 6.95 10*3/MM3 (ref 1.7–7)
NEUTROPHILS NFR BLD AUTO: 75.1 % (ref 42.7–76)
NRBC BLD AUTO-RTO: 0 /100 WBC (ref 0–0.2)
PLATELET # BLD AUTO: 226 10*3/MM3 (ref 140–450)
PMV BLD AUTO: 9.1 FL (ref 6–12)
POTASSIUM SERPL-SCNC: 3.3 MMOL/L (ref 3.5–5.2)
RBC # BLD AUTO: 5.08 10*6/MM3 (ref 4.14–5.8)
SODIUM SERPL-SCNC: 138 MMOL/L (ref 136–145)
WBC # BLD AUTO: 9.26 10*3/MM3 (ref 3.4–10.8)

## 2021-03-13 PROCEDURE — 80048 BASIC METABOLIC PNL TOTAL CA: CPT | Performed by: FAMILY MEDICINE

## 2021-03-13 PROCEDURE — 99239 HOSP IP/OBS DSCHRG MGMT >30: CPT | Performed by: FAMILY MEDICINE

## 2021-03-13 PROCEDURE — 85025 COMPLETE CBC W/AUTO DIFF WBC: CPT | Performed by: FAMILY MEDICINE

## 2021-03-13 RX ORDER — HYDROCODONE BITARTRATE AND ACETAMINOPHEN 5; 325 MG/1; MG/1
1 TABLET ORAL EVERY 4 HOURS PRN
Qty: 18 TABLET | Refills: 0 | Status: SHIPPED | OUTPATIENT
Start: 2021-03-13 | End: 2021-03-16

## 2021-03-13 RX ORDER — AZITHROMYCIN 250 MG/1
TABLET, FILM COATED ORAL
Qty: 4 TABLET | Refills: 0 | Status: SHIPPED | OUTPATIENT
Start: 2021-03-13 | End: 2021-03-22

## 2021-03-13 RX ORDER — MAGNESIUM SULFATE HEPTAHYDRATE 40 MG/ML
2 INJECTION, SOLUTION INTRAVENOUS AS NEEDED
Status: DISCONTINUED | OUTPATIENT
Start: 2021-03-13 | End: 2021-03-13 | Stop reason: HOSPADM

## 2021-03-13 RX ORDER — LISINOPRIL 20 MG/1
20 TABLET ORAL
Qty: 30 TABLET | Refills: 1 | OUTPATIENT
Start: 2021-03-13 | End: 2021-03-23

## 2021-03-13 RX ORDER — HYDRALAZINE HYDROCHLORIDE 25 MG/1
25 TABLET, FILM COATED ORAL 3 TIMES DAILY
Qty: 90 TABLET | Refills: 1 | OUTPATIENT
Start: 2021-03-13 | End: 2021-03-23

## 2021-03-13 RX ORDER — AZITHROMYCIN 250 MG/1
TABLET, FILM COATED ORAL
Qty: 4 TABLET | Refills: 0 | Status: SHIPPED | OUTPATIENT
Start: 2021-03-13 | End: 2021-03-13

## 2021-03-13 RX ORDER — MAGNESIUM SULFATE HEPTAHYDRATE 40 MG/ML
4 INJECTION, SOLUTION INTRAVENOUS AS NEEDED
Status: DISCONTINUED | OUTPATIENT
Start: 2021-03-13 | End: 2021-03-13 | Stop reason: HOSPADM

## 2021-03-13 RX ORDER — POTASSIUM CHLORIDE 1.5 G/1.77G
40 POWDER, FOR SOLUTION ORAL AS NEEDED
Status: DISCONTINUED | OUTPATIENT
Start: 2021-03-13 | End: 2021-03-13 | Stop reason: HOSPADM

## 2021-03-13 RX ORDER — POTASSIUM CHLORIDE 750 MG/1
40 CAPSULE, EXTENDED RELEASE ORAL AS NEEDED
Status: DISCONTINUED | OUTPATIENT
Start: 2021-03-13 | End: 2021-03-13 | Stop reason: HOSPADM

## 2021-03-13 RX ORDER — HYDROCHLOROTHIAZIDE 25 MG/1
25 TABLET ORAL DAILY
Qty: 30 TABLET | Refills: 1 | Status: SHIPPED | OUTPATIENT
Start: 2021-03-13 | End: 2021-05-12

## 2021-03-13 RX ADMIN — SODIUM CHLORIDE, PRESERVATIVE FREE 10 ML: 5 INJECTION INTRAVENOUS at 09:45

## 2021-03-13 RX ADMIN — Medication 1 CAPSULE: at 09:46

## 2021-03-13 RX ADMIN — TAMSULOSIN HYDROCHLORIDE 0.4 MG: 0.4 CAPSULE ORAL at 09:44

## 2021-03-13 RX ADMIN — HYDROCODONE BITARTRATE AND ACETAMINOPHEN 1 TABLET: 5; 325 TABLET ORAL at 09:44

## 2021-03-13 RX ADMIN — FOLIC ACID 1 MG: 1 TABLET ORAL at 09:44

## 2021-03-13 RX ADMIN — HYDROCHLOROTHIAZIDE 25 MG: 25 TABLET ORAL at 09:44

## 2021-03-13 RX ADMIN — PROPRANOLOL HYDROCHLORIDE 20 MG: 10 TABLET ORAL at 09:44

## 2021-03-13 RX ADMIN — LISINOPRIL 20 MG: 20 TABLET ORAL at 09:44

## 2021-03-13 RX ADMIN — POTASSIUM CHLORIDE 40 MEQ: 1.5 POWDER, FOR SOLUTION ORAL at 09:44

## 2021-03-13 RX ADMIN — THIAMINE HCL TAB 100 MG 100 MG: 100 TAB at 09:45

## 2021-03-13 RX ADMIN — NIFEDIPINE 10 MG: 10 CAPSULE ORAL at 06:02

## 2021-03-13 NOTE — PLAN OF CARE
Goal Outcome Evaluation:  Plan of Care Reviewed With: patient     Outcome Summary: Cardene gtt stopped, PO meds started with BP coming down throughout day.  VSS, weaned from 2LNC to RA in afternoon.  PRN ativan given x2 for CIWA 8-11.  Pt slept much of day.  C/O tooth pain, offered PRN medication, pt denied.  Abx started.

## 2021-03-13 NOTE — PLAN OF CARE
Goal Outcome Evaluation:   VSS this shift on room air. Patient A/O x4. NSR to sinus wendi per monitor. Patient complains of tooth/jaw pain 5/10 constantly but refuses medication. PRN for pain administered x1. CIWA 1-5, PRN ativan not needed. Patient reported he is anxious to return home. Patient denies any needs at this time.

## 2021-03-13 NOTE — DISCHARGE SUMMARY
[facility] Medicine Services  DISCHARGE SUMMARY    Patient Name: Jesus Soria  : 1965  MRN: [id number]    Date of Admission: 3/11/2021  1:44 PM  Date of Discharge:  21    Primary Care Physician: Provider, No Known    Consults     No orders found from 2/10/2021 to 3/12/2021.          Hospital Course     Presenting Problem:   Hypertensive crisis [I16.9]    Active Hospital Problems    Diagnosis  POA   • **Hypertensive crisis [I16.9]  Yes   • Alcoholism (CMS/HCC) [F10.20]  Yes   • OCD (obsessive compulsive disorder) [F42.9]  Yes   • Anxiety disorder [F41.9]  Yes   • Pain, dental [K08.89]  Yes      Resolved Hospital Problems   No resolved problems to display.          Hospital Course:  Jesus Soria is a 55 y.o. male admitted with hypertensive crisis and dental abscess.     Hypertensive crisis  -Initially treated with Cardene drip  -Adjusted oral medications and stopped the drip on 3/12/2021  -Patient denies any chest pain or headache  -Continue his home blood pressure meds, added lisinopril 20, hydralazine 25 3 times daily and HCTZ 25 daily.     Dental caries/abscess  -Patient has an appointment on Monday with a dentist for referral  -Started azithromycin secondary to penicillin allergy in the interim  -Started probiotic while on antibiotics, may continue with over-the-counter probiotic at discharge  -3-day supply of Norco given at discharge, counseled patient on controlled substance prescription     Alcohol abuse  Anxiety disorder  -Patient states he has never had a withdrawal from alcohol  -Patient states he frequently goes several days without drinking and does not have any problems  -Initiated CIWA protocol while admitted     Likely sleep apnea  Transient hypoxia  -intermittently used 2 L while sleeping  -Suggested sleep study as an outpatient however could be related to current dental caries/abscess and oropharyngeal swelling      Discharge Follow Up Recommendations for  outpatient labs/diagnostics:  Follow-up with PCP to establish care and check blood pressure in 1 week  Follow-up with dentist on Monday as scheduled        Day of Discharge     HPI:   Patient is feeling better overall and his blood pressure is much improved.  I have prescribed several new medications.  He requests prescription for pain medication till he can see the dentist on Monday.    Review of Systems  Denies chest pain fever chills abdominal pain nausea vomiting or diarrhea    Vital Signs:   Temp:  [97.7 °F (36.5 °C)-98.3 °F (36.8 °C)] 98.1 °F (36.7 °C)  Heart Rate:  [54-83] 68  Resp:  [16-20] 16  BP: (129-163)/() 163/99     Physical Exam:  Constitutional: No acute distress, awake, alert, nontoxic, normal body habitus  Eyes: Pupils equal, sclerae anicteric, no conjunctival injection  HENT: Face is swollen due to dental caries/possible abscess, mucous membranes moist  Neck: Supple, no thyromegaly, no lymphadenopathy  Respiratory: Clear to auscultation bilaterally, good effort, nonlabored respirations   Cardiovascular: RRR  Gastrointestinal: Positive bowel sounds, soft, nontender, nondistended  Musculoskeletal: No peripheral edema, normal muscle tone for age  Psychiatric: Appropriate affect, good insight and judgement, cooperative  Neurologic: Oriented x 3, movements symmetric BUE and BLE, Cranial Nerves grossly intact, speech clear and fluent  Skin: No rashes, no jaundice, no petechiae, no mottling    Pertinent  and/or Most Recent Results     LAB RESULTS:      Lab 03/13/21  0509 03/11/21  1511   WBC 9.26 9.08   HEMOGLOBIN 14.8 15.2   HEMATOCRIT 43.9 43.6   PLATELETS 226 217   NEUTROS ABS 6.95 7.51*   IMMATURE GRANS (ABS) 0.03 0.04   LYMPHS ABS 1.41 1.01   MONOS ABS 0.70 0.46   EOS ABS 0.13 0.03   MCV 86.4 84.8         Lab 03/13/21  0509 03/11/21  1511   SODIUM 138 140   POTASSIUM 3.3* 3.8   CHLORIDE 101 104   CO2 27.0 26.0   ANION GAP 10.0 10.0   BUN 14 12   CREATININE 1.25 1.06   GLUCOSE 104* 128*    CALCIUM 9.3 9.2         Lab 03/11/21  1511   TOTAL PROTEIN 6.6   ALBUMIN 4.20   GLOBULIN 2.4   ALT (SGPT) 32   AST (SGOT) 23   BILIRUBIN 0.5   ALK PHOS 52         Lab 03/11/21 1804 03/11/21  1511   TROPONIN T <0.010 <0.010                 Brief Urine Lab Results  (Last result in the past 365 days)      Color   Clarity   Blood   Leuk Est   Nitrite   Protein   CREAT   Urine HCG        03/11/21 1625 Yellow Clear Trace Negative Negative Negative             Microbiology Results (last 10 days)     Procedure Component Value - Date/Time    COVID PRE-OP / PRE-PROCEDURE SCREENING ORDER (NO ISOLATION) - Swab, Nasopharynx [[id number]]  (Normal) Collected: 03/11/21 1804    Lab Status: Final result Specimen: Swab from Nasopharynx Updated: 03/11/21 1835    Narrative:      The following orders were created for panel order COVID PRE-OP / PRE-PROCEDURE SCREENING ORDER (NO ISOLATION) - Swab, Nasopharynx.  Procedure                               Abnormality         Status                     ---------                               -----------         ------                     COVID-19 and FLU A/B PCR...[[id number]]  Normal              Final result                 Please view results for these tests on the individual orders.    COVID-19 and FLU A/B PCR - Swab, Nasopharynx [[id number]]  (Normal) Collected: 03/11/21 1804    Lab Status: Final result Specimen: Swab from Nasopharynx Updated: 03/11/21 1835     COVID19 Not Detected     Influenza A PCR Not Detected     Influenza B PCR Not Detected    Narrative:      Fact sheet for providers: https://www.fda.gov/media/527838/download    Fact sheet for patients: https://www.fda.gov/media/977490/download    Test performed by PCR.          CT Maxillofacial Without Contrast    Result Date: 3/11/2021  EXAMINATION: CT MAXILLOFACIAL WO CONT-  INDICATION: R upper molar dental pain, evaluate for abscess  TECHNIQUE: Axial noncontrast CT of the face with multiplanar reconstruction  The radiation dose  reduction device was turned on for each scan per the ALARA (As Low as Reasonably Achievable) protocol.  COMPARISON: NONE  FINDINGS: Limited intracranial evaluation demonstrates no acute findings. The orbits are normal and the paranasal sinuses demonstrates mucous retention cysts in the maxillary sinuses. The superficial facial soft tissues are unremarkable. No acute facial bone fracture. The mastoid air cells are clear. Odontogenic disease is present with areas of caries formation, without evidence of distinct periapical lucency or adjacent lingual or buccal surface of the odontogenic abscess.      Numerous dental caries are present, without evidence of significant periapical lucency or adjacent odontogenic abscess.   This report was finalized on 3/11/2021 4:18 PM by Mitchel Norman.                    Plan for Follow-up of Pending Labs/Results: No pending labs at the time of discharge    Discharge Details        Discharge Medications      New Medications      Instructions Start Date   azithromycin 250 MG tablet  Commonly known as: ZITHROMAX   Take one daily. Dental caries/abscess with pcn allergy      hydrALAZINE 25 MG tablet  Commonly known as: APRESOLINE   25 mg, Oral, 3 Times Daily      hydroCHLOROthiazide 25 MG tablet  Commonly known as: HYDRODIURIL   25 mg, Oral, Daily      HYDROcodone-acetaminophen 5-325 MG per tablet  Commonly known as: NORCO   1 tablet, Oral, Every 4 Hours PRN      lisinopril 20 MG tablet  Commonly known as: PRINIVIL,ZESTRIL   20 mg, Oral, Every 24 Hours Scheduled         Continue These Medications      Instructions Start Date   AMLODIPINE BENZOATE PO   Oral      escitalopram 10 MG tablet  Commonly known as: LEXAPRO   10 mg, Oral, Daily      mirtazapine 15 MG tablet  Commonly known as: REMERON   15 mg, Oral, Nightly      propranolol 20 MG tablet  Commonly known as: INDERAL   20 mg, Oral, 3 Times Daily, Patient reports taking 1-2 times a day for anxiety but is unsure of dose        venlafaxine 75 MG tablet  Commonly known as: EFFEXOR   75 mg, Oral, 2 Times Daily      vitamin B-12 500 MCG tablet  Commonly known as: CYANOCOBALAMIN   500 mcg, Oral, Daily      Vitamin D (Cholecalciferol) 10 MCG (400 UNIT) tablet  Commonly known as: CHOLECALCIFEROL   400 Units, Oral, Daily             Allergies   Allergen Reactions   • Amoxicillin Hives         Discharge Disposition:  Home or Self Care    Diet:  Hospital:  Diet Order   Procedures   • Diet Regular; Consistent Carbohydrate       Activity:  Activity Instructions     Activity as Tolerated            Restrictions or Other Recommendations:  Due to the COVID-19 pandemic please practice physical distancing, wear a mask when you are in public and wash your hands often.       CODE STATUS:    Code Status and Medical Interventions:   Ordered at: 03/11/21 1827     Level Of Support Discussed With:    Patient     Code Status:    CPR     Medical Interventions (Level of Support Prior to Arrest):    Full       No future appointments.    Additional Instructions for the Follow-ups that You Need to Schedule     Discharge Follow-up with PCP   As directed       Currently Documented PCP:    Provider, No Known    PCP Phone Number:    None     Follow Up Details: establish care and check bp                     Jeanna Anton MD  03/13/21      Time Spent on Discharge:  I spent  40  minutes on this discharge activity which included: face-to-face encounter with the patient, reviewing the data in the system, coordination of the care with the nursing staff as well as consultants, documentation, and entering orders.

## 2021-03-15 LAB
QT INTERVAL: 356 MS
QTC INTERVAL: 481 MS

## 2021-03-22 ENCOUNTER — APPOINTMENT (OUTPATIENT)
Dept: GENERAL RADIOLOGY | Facility: HOSPITAL | Age: 56
End: 2021-03-22

## 2021-03-22 ENCOUNTER — HOSPITAL ENCOUNTER (OUTPATIENT)
Facility: HOSPITAL | Age: 56
Setting detail: OBSERVATION
Discharge: HOME | End: 2021-03-23
Attending: EMERGENCY MEDICINE | Admitting: INTERNAL MEDICINE

## 2021-03-22 DIAGNOSIS — Z91.14 NONCOMPLIANCE WITH MEDICATION REGIMEN: ICD-10-CM

## 2021-03-22 DIAGNOSIS — K02.9 DENTAL CARIES: ICD-10-CM

## 2021-03-22 DIAGNOSIS — K08.89 ODONTALGIA: ICD-10-CM

## 2021-03-22 DIAGNOSIS — I16.0 HYPERTENSIVE URGENCY: Primary | ICD-10-CM

## 2021-03-22 DIAGNOSIS — I10 SEVERE HYPERTENSION: ICD-10-CM

## 2021-03-22 PROBLEM — E87.1 HYPONATREMIA: Status: ACTIVE | Noted: 2021-03-22

## 2021-03-22 PROBLEM — Z91.199 MEDICAL NON-COMPLIANCE: Status: ACTIVE | Noted: 2021-03-11

## 2021-03-22 LAB
ALBUMIN SERPL-MCNC: 4.1 G/DL (ref 3.5–5.2)
ALBUMIN/GLOB SERPL: 1.6 G/DL
ALP SERPL-CCNC: 53 U/L (ref 39–117)
ALT SERPL W P-5'-P-CCNC: 23 U/L (ref 1–41)
ANION GAP SERPL CALCULATED.3IONS-SCNC: 14 MMOL/L (ref 5–15)
AST SERPL-CCNC: 33 U/L (ref 1–40)
BASOPHILS # BLD AUTO: 0.02 10*3/MM3 (ref 0–0.2)
BASOPHILS NFR BLD AUTO: 0.2 % (ref 0–1.5)
BILIRUB SERPL-MCNC: 0.5 MG/DL (ref 0–1.2)
BILIRUB UR QL STRIP: NEGATIVE
BUN SERPL-MCNC: 12 MG/DL (ref 6–20)
BUN/CREAT SERPL: 9.8 (ref 7–25)
CALCIUM SPEC-SCNC: 9 MG/DL (ref 8.6–10.5)
CHLORIDE SERPL-SCNC: 94 MMOL/L (ref 98–107)
CLARITY UR: CLEAR
CO2 SERPL-SCNC: 23 MMOL/L (ref 22–29)
COLOR UR: YELLOW
CREAT SERPL-MCNC: 1.23 MG/DL (ref 0.76–1.27)
DEPRECATED RDW RBC AUTO: 40.8 FL (ref 37–54)
EOSINOPHIL # BLD AUTO: 0.06 10*3/MM3 (ref 0–0.4)
EOSINOPHIL NFR BLD AUTO: 0.7 % (ref 0.3–6.2)
ERYTHROCYTE [DISTWIDTH] IN BLOOD BY AUTOMATED COUNT: 13.2 % (ref 12.3–15.4)
ETHANOL BLD-MCNC: <10 MG/DL (ref 0–10)
GFR SERPL CREATININE-BSD FRML MDRD: 61 ML/MIN/1.73
GLOBULIN UR ELPH-MCNC: 2.6 GM/DL
GLUCOSE SERPL-MCNC: 107 MG/DL (ref 65–99)
GLUCOSE UR STRIP-MCNC: NEGATIVE MG/DL
HCT VFR BLD AUTO: 41.8 % (ref 37.5–51)
HGB BLD-MCNC: 14.6 G/DL (ref 13–17.7)
HGB UR QL STRIP.AUTO: NEGATIVE
HOLD SPECIMEN: NORMAL
IMM GRANULOCYTES # BLD AUTO: 0.02 10*3/MM3 (ref 0–0.05)
IMM GRANULOCYTES NFR BLD AUTO: 0.2 % (ref 0–0.5)
KETONES UR QL STRIP: ABNORMAL
LEUKOCYTE ESTERASE UR QL STRIP.AUTO: NEGATIVE
LIPASE SERPL-CCNC: 158 U/L (ref 13–60)
LYMPHOCYTES # BLD AUTO: 1.55 10*3/MM3 (ref 0.7–3.1)
LYMPHOCYTES NFR BLD AUTO: 17.1 % (ref 19.6–45.3)
MCH RBC QN AUTO: 29.4 PG (ref 26.6–33)
MCHC RBC AUTO-ENTMCNC: 34.9 G/DL (ref 31.5–35.7)
MCV RBC AUTO: 84.3 FL (ref 79–97)
MONOCYTES # BLD AUTO: 0.59 10*3/MM3 (ref 0.1–0.9)
MONOCYTES NFR BLD AUTO: 6.5 % (ref 5–12)
NEUTROPHILS NFR BLD AUTO: 6.83 10*3/MM3 (ref 1.7–7)
NEUTROPHILS NFR BLD AUTO: 75.3 % (ref 42.7–76)
NITRITE UR QL STRIP: NEGATIVE
NRBC BLD AUTO-RTO: 0 /100 WBC (ref 0–0.2)
NT-PROBNP SERPL-MCNC: 189.5 PG/ML (ref 0–900)
PH UR STRIP.AUTO: 7.5 [PH] (ref 5–8)
PLATELET # BLD AUTO: 332 10*3/MM3 (ref 140–450)
PMV BLD AUTO: 9 FL (ref 6–12)
POTASSIUM SERPL-SCNC: 4 MMOL/L (ref 3.5–5.2)
PROT SERPL-MCNC: 6.7 G/DL (ref 6–8.5)
PROT UR QL STRIP: NEGATIVE
RBC # BLD AUTO: 4.96 10*6/MM3 (ref 4.14–5.8)
SARS-COV-2 RNA RESP QL NAA+PROBE: NOT DETECTED
SODIUM SERPL-SCNC: 131 MMOL/L (ref 136–145)
SP GR UR STRIP: <=1.005 (ref 1–1.03)
TROPONIN T SERPL-MCNC: <0.01 NG/ML (ref 0–0.03)
UROBILINOGEN UR QL STRIP: ABNORMAL
WBC # BLD AUTO: 9.07 10*3/MM3 (ref 3.4–10.8)
WHOLE BLOOD HOLD SPECIMEN: NORMAL
WHOLE BLOOD HOLD SPECIMEN: NORMAL

## 2021-03-22 PROCEDURE — 93005 ELECTROCARDIOGRAM TRACING: CPT | Performed by: EMERGENCY MEDICINE

## 2021-03-22 PROCEDURE — 84484 ASSAY OF TROPONIN QUANT: CPT | Performed by: EMERGENCY MEDICINE

## 2021-03-22 PROCEDURE — 93005 ELECTROCARDIOGRAM TRACING: CPT

## 2021-03-22 PROCEDURE — 82077 ASSAY SPEC XCP UR&BREATH IA: CPT | Performed by: EMERGENCY MEDICINE

## 2021-03-22 PROCEDURE — G0378 HOSPITAL OBSERVATION PER HR: HCPCS

## 2021-03-22 PROCEDURE — 96366 THER/PROPH/DIAG IV INF ADDON: CPT

## 2021-03-22 PROCEDURE — 80053 COMPREHEN METABOLIC PANEL: CPT

## 2021-03-22 PROCEDURE — 81003 URINALYSIS AUTO W/O SCOPE: CPT | Performed by: EMERGENCY MEDICINE

## 2021-03-22 PROCEDURE — 99220 PR INITIAL OBSERVATION CARE/DAY 70 MINUTES: CPT | Performed by: INTERNAL MEDICINE

## 2021-03-22 PROCEDURE — 84484 ASSAY OF TROPONIN QUANT: CPT | Performed by: PHYSICIAN ASSISTANT

## 2021-03-22 PROCEDURE — 83880 ASSAY OF NATRIURETIC PEPTIDE: CPT

## 2021-03-22 PROCEDURE — 99285 EMERGENCY DEPT VISIT HI MDM: CPT

## 2021-03-22 PROCEDURE — 96365 THER/PROPH/DIAG IV INF INIT: CPT

## 2021-03-22 PROCEDURE — 84484 ASSAY OF TROPONIN QUANT: CPT

## 2021-03-22 PROCEDURE — 85025 COMPLETE CBC W/AUTO DIFF WBC: CPT

## 2021-03-22 PROCEDURE — 83690 ASSAY OF LIPASE: CPT

## 2021-03-22 PROCEDURE — U0003 INFECTIOUS AGENT DETECTION BY NUCLEIC ACID (DNA OR RNA); SEVERE ACUTE RESPIRATORY SYNDROME CORONAVIRUS 2 (SARS-COV-2) (CORONAVIRUS DISEASE [COVID-19]), AMPLIFIED PROBE TECHNIQUE, MAKING USE OF HIGH THROUGHPUT TECHNOLOGIES AS DESCRIBED BY CMS-2020-01-R: HCPCS | Performed by: PHYSICIAN ASSISTANT

## 2021-03-22 PROCEDURE — 96368 THER/DIAG CONCURRENT INF: CPT

## 2021-03-22 PROCEDURE — 71045 X-RAY EXAM CHEST 1 VIEW: CPT

## 2021-03-22 PROCEDURE — 96375 TX/PRO/DX INJ NEW DRUG ADDON: CPT

## 2021-03-22 RX ORDER — HYDRALAZINE HYDROCHLORIDE 25 MG/1
25 TABLET, FILM COATED ORAL 3 TIMES DAILY
Status: CANCELLED | OUTPATIENT
Start: 2021-03-22

## 2021-03-22 RX ORDER — LISINOPRIL 10 MG/1
20 TABLET ORAL
Status: CANCELLED | OUTPATIENT
Start: 2021-03-23

## 2021-03-22 RX ORDER — HYDROCODONE BITARTRATE AND ACETAMINOPHEN 5; 325 MG/1; MG/1
1 TABLET ORAL EVERY 6 HOURS PRN
Status: DISCONTINUED | OUTPATIENT
Start: 2021-03-22 | End: 2021-03-23 | Stop reason: HOSPADM

## 2021-03-22 RX ORDER — METOPROLOL TARTRATE 5 MG/5ML
5 INJECTION INTRAVENOUS ONCE
Status: COMPLETED | OUTPATIENT
Start: 2021-03-22 | End: 2021-03-22

## 2021-03-22 RX ORDER — SODIUM CHLORIDE 0.9 % (FLUSH) 0.9 %
10 SYRINGE (ML) INJECTION AS NEEDED
Status: DISCONTINUED | OUTPATIENT
Start: 2021-03-22 | End: 2021-03-23 | Stop reason: HOSPADM

## 2021-03-22 RX ORDER — LIDOCAINE HYDROCHLORIDE 20 MG/ML
15 SOLUTION OROPHARYNGEAL ONCE
Status: COMPLETED | OUTPATIENT
Start: 2021-03-22 | End: 2021-03-22

## 2021-03-22 RX ORDER — ASPIRIN 81 MG/1
324 TABLET, CHEWABLE ORAL ONCE
Status: COMPLETED | OUTPATIENT
Start: 2021-03-22 | End: 2021-03-22

## 2021-03-22 RX ORDER — LIDOCAINE HYDROCHLORIDE 20 MG/ML
10 SOLUTION OROPHARYNGEAL ONCE
Status: COMPLETED | OUTPATIENT
Start: 2021-03-22 | End: 2021-03-22

## 2021-03-22 RX ORDER — CLONIDINE HYDROCHLORIDE 0.1 MG/1
0.1 TABLET ORAL ONCE
Status: COMPLETED | OUTPATIENT
Start: 2021-03-22 | End: 2021-03-22

## 2021-03-22 RX ORDER — ALUMINA, MAGNESIA, AND SIMETHICONE 2400; 2400; 240 MG/30ML; MG/30ML; MG/30ML
15 SUSPENSION ORAL ONCE
Status: COMPLETED | OUTPATIENT
Start: 2021-03-22 | End: 2021-03-22

## 2021-03-22 RX ORDER — HYDROCHLOROTHIAZIDE 25 MG/1
25 TABLET ORAL DAILY
Status: CANCELLED | OUTPATIENT
Start: 2021-03-23

## 2021-03-22 RX ORDER — FAMOTIDINE 10 MG/ML
20 INJECTION, SOLUTION INTRAVENOUS ONCE
Status: COMPLETED | OUTPATIENT
Start: 2021-03-22 | End: 2021-03-22

## 2021-03-22 RX ORDER — MIRTAZAPINE 15 MG/1
15 TABLET, FILM COATED ORAL NIGHTLY
Status: DISCONTINUED | OUTPATIENT
Start: 2021-03-23 | End: 2021-03-23 | Stop reason: HOSPADM

## 2021-03-22 RX ADMIN — THIAMINE HYDROCHLORIDE 100 ML/HR: 100 INJECTION, SOLUTION INTRAMUSCULAR; INTRAVENOUS at 23:24

## 2021-03-22 RX ADMIN — LIDOCAINE HYDROCHLORIDE 15 ML: 20 SOLUTION ORAL; TOPICAL at 19:50

## 2021-03-22 RX ADMIN — LIDOCAINE HYDROCHLORIDE 10 ML: 20 SOLUTION ORAL; TOPICAL at 22:07

## 2021-03-22 RX ADMIN — ALUMINUM HYDROXIDE, MAGNESIUM HYDROXIDE, AND DIMETHICONE 15 ML: 400; 400; 40 SUSPENSION ORAL at 19:50

## 2021-03-22 RX ADMIN — HYDROCODONE BITARTRATE AND ACETAMINOPHEN 1 TABLET: 5; 325 TABLET ORAL at 22:40

## 2021-03-22 RX ADMIN — NICARDIPINE HYDROCHLORIDE 5 MG/HR: 0.1 INJECTION, SOLUTION INTRAVENOUS at 21:10

## 2021-03-22 RX ADMIN — FAMOTIDINE 20 MG: 10 INJECTION, SOLUTION INTRAVENOUS at 19:50

## 2021-03-22 RX ADMIN — METOPROLOL TARTRATE 5 MG: 5 INJECTION INTRAVENOUS at 19:04

## 2021-03-22 RX ADMIN — CLONIDINE HYDROCHLORIDE 0.1 MG: 0.1 TABLET ORAL at 19:50

## 2021-03-22 RX ADMIN — ASPIRIN 324 MG: 81 TABLET, CHEWABLE ORAL at 19:02

## 2021-03-23 VITALS
HEIGHT: 70 IN | WEIGHT: 220 LBS | BODY MASS INDEX: 31.5 KG/M2 | SYSTOLIC BLOOD PRESSURE: 149 MMHG | HEART RATE: 108 BPM | OXYGEN SATURATION: 96 % | RESPIRATION RATE: 16 BRPM | TEMPERATURE: 98.2 F | DIASTOLIC BLOOD PRESSURE: 92 MMHG

## 2021-03-23 PROBLEM — I16.0 HYPERTENSIVE URGENCY: Status: RESOLVED | Noted: 2021-03-22 | Resolved: 2021-03-23

## 2021-03-23 LAB
AMPHET+METHAMPHET UR QL: NEGATIVE
AMPHETAMINES UR QL: NEGATIVE
ANION GAP SERPL CALCULATED.3IONS-SCNC: 10 MMOL/L (ref 5–15)
BARBITURATES UR QL SCN: NEGATIVE
BENZODIAZ UR QL SCN: NEGATIVE
BUN SERPL-MCNC: 9 MG/DL (ref 6–20)
BUN/CREAT SERPL: 8.2 (ref 7–25)
BUPRENORPHINE SERPL-MCNC: NEGATIVE NG/ML
CALCIUM SPEC-SCNC: 8.4 MG/DL (ref 8.6–10.5)
CANNABINOIDS SERPL QL: NEGATIVE
CHLORIDE SERPL-SCNC: 104 MMOL/L (ref 98–107)
CO2 SERPL-SCNC: 24 MMOL/L (ref 22–29)
COCAINE UR QL: NEGATIVE
CORTIS SERPL-MCNC: 8.6 MCG/DL
CREAT SERPL-MCNC: 1.1 MG/DL (ref 0.76–1.27)
DEPRECATED RDW RBC AUTO: 40.8 FL (ref 37–54)
ERYTHROCYTE [DISTWIDTH] IN BLOOD BY AUTOMATED COUNT: 13.2 % (ref 12.3–15.4)
GFR SERPL CREATININE-BSD FRML MDRD: 70 ML/MIN/1.73
GLUCOSE SERPL-MCNC: 120 MG/DL (ref 65–99)
HBA1C MFR BLD: 5.5 % (ref 4.8–5.6)
HCT VFR BLD AUTO: 41.5 % (ref 37.5–51)
HGB BLD-MCNC: 14.8 G/DL (ref 13–17.7)
MCH RBC QN AUTO: 30.5 PG (ref 26.6–33)
MCHC RBC AUTO-ENTMCNC: 35.7 G/DL (ref 31.5–35.7)
MCV RBC AUTO: 85.6 FL (ref 79–97)
METHADONE UR QL SCN: NEGATIVE
OPIATES UR QL: POSITIVE
OSMOLALITY SERPL: 285 MOSM/KG (ref 275–295)
OSMOLALITY UR: 119 MOSM/KG (ref 300–1100)
OXYCODONE UR QL SCN: NEGATIVE
PCP UR QL SCN: NEGATIVE
PLATELET # BLD AUTO: 258 10*3/MM3 (ref 140–450)
PMV BLD AUTO: 8.7 FL (ref 6–12)
POTASSIUM SERPL-SCNC: 4 MMOL/L (ref 3.5–5.2)
PROPOXYPH UR QL: NEGATIVE
QT INTERVAL: 360 MS
QTC INTERVAL: 480 MS
RBC # BLD AUTO: 4.85 10*6/MM3 (ref 4.14–5.8)
SODIUM SERPL-SCNC: 138 MMOL/L (ref 136–145)
SODIUM UR-SCNC: 38 MMOL/L
TRICYCLICS UR QL SCN: NEGATIVE
TSH SERPL DL<=0.05 MIU/L-ACNC: 2.82 UIU/ML (ref 0.27–4.2)
WBC # BLD AUTO: 7.64 10*3/MM3 (ref 3.4–10.8)

## 2021-03-23 PROCEDURE — G0378 HOSPITAL OBSERVATION PER HR: HCPCS

## 2021-03-23 PROCEDURE — 85027 COMPLETE CBC AUTOMATED: CPT | Performed by: PHYSICIAN ASSISTANT

## 2021-03-23 PROCEDURE — 96366 THER/PROPH/DIAG IV INF ADDON: CPT

## 2021-03-23 PROCEDURE — 83930 ASSAY OF BLOOD OSMOLALITY: CPT | Performed by: PHYSICIAN ASSISTANT

## 2021-03-23 PROCEDURE — 82533 TOTAL CORTISOL: CPT | Performed by: PHYSICIAN ASSISTANT

## 2021-03-23 PROCEDURE — 25010000002 THIAMINE PER 100 MG: Performed by: PHYSICIAN ASSISTANT

## 2021-03-23 PROCEDURE — 99217 PR OBSERVATION CARE DISCHARGE MANAGEMENT: CPT | Performed by: INTERNAL MEDICINE

## 2021-03-23 PROCEDURE — 80048 BASIC METABOLIC PNL TOTAL CA: CPT | Performed by: PHYSICIAN ASSISTANT

## 2021-03-23 PROCEDURE — 80306 DRUG TEST PRSMV INSTRMNT: CPT | Performed by: PHYSICIAN ASSISTANT

## 2021-03-23 PROCEDURE — 84443 ASSAY THYROID STIM HORMONE: CPT | Performed by: PHYSICIAN ASSISTANT

## 2021-03-23 PROCEDURE — 83036 HEMOGLOBIN GLYCOSYLATED A1C: CPT | Performed by: PHYSICIAN ASSISTANT

## 2021-03-23 PROCEDURE — 84300 ASSAY OF URINE SODIUM: CPT | Performed by: PHYSICIAN ASSISTANT

## 2021-03-23 PROCEDURE — 83935 ASSAY OF URINE OSMOLALITY: CPT | Performed by: PHYSICIAN ASSISTANT

## 2021-03-23 RX ORDER — ACETAMINOPHEN 325 MG/1
650 TABLET ORAL EVERY 4 HOURS PRN
Status: DISCONTINUED | OUTPATIENT
Start: 2021-03-23 | End: 2021-03-23 | Stop reason: HOSPADM

## 2021-03-23 RX ORDER — SODIUM CHLORIDE 0.9 % (FLUSH) 0.9 %
10 SYRINGE (ML) INJECTION AS NEEDED
Status: DISCONTINUED | OUTPATIENT
Start: 2021-03-23 | End: 2021-03-23 | Stop reason: HOSPADM

## 2021-03-23 RX ORDER — AMLODIPINE BESYLATE 10 MG/1
10 TABLET ORAL
Qty: 30 TABLET | Refills: 0 | Status: SHIPPED | OUTPATIENT
Start: 2021-03-24 | End: 2021-04-23

## 2021-03-23 RX ORDER — ONDANSETRON 2 MG/ML
4 INJECTION INTRAMUSCULAR; INTRAVENOUS EVERY 6 HOURS PRN
Status: DISCONTINUED | OUTPATIENT
Start: 2021-03-23 | End: 2021-03-23 | Stop reason: HOSPADM

## 2021-03-23 RX ORDER — SODIUM CHLORIDE 0.9 % (FLUSH) 0.9 %
10 SYRINGE (ML) INJECTION EVERY 12 HOURS SCHEDULED
Status: DISCONTINUED | OUTPATIENT
Start: 2021-03-23 | End: 2021-03-23 | Stop reason: HOSPADM

## 2021-03-23 RX ORDER — VENLAFAXINE 75 MG/1
75 TABLET ORAL 2 TIMES DAILY WITH MEALS
Status: DISCONTINUED | OUTPATIENT
Start: 2021-03-23 | End: 2021-03-23 | Stop reason: HOSPADM

## 2021-03-23 RX ORDER — HEPARIN SODIUM 5000 [USP'U]/ML
5000 INJECTION, SOLUTION INTRAVENOUS; SUBCUTANEOUS EVERY 12 HOURS SCHEDULED
Status: DISCONTINUED | OUTPATIENT
Start: 2021-03-23 | End: 2021-03-23 | Stop reason: HOSPADM

## 2021-03-23 RX ORDER — AMLODIPINE BESYLATE 10 MG/1
10 TABLET ORAL
Status: DISCONTINUED | OUTPATIENT
Start: 2021-03-23 | End: 2021-03-23 | Stop reason: HOSPADM

## 2021-03-23 RX ORDER — CHOLECALCIFEROL (VITAMIN D3) 125 MCG
5 CAPSULE ORAL NIGHTLY PRN
Status: DISCONTINUED | OUTPATIENT
Start: 2021-03-23 | End: 2021-03-23 | Stop reason: HOSPADM

## 2021-03-23 RX ADMIN — NICARDIPINE HYDROCHLORIDE 10 MG/HR: 0.1 INJECTION, SOLUTION INTRAVENOUS at 04:21

## 2021-03-23 RX ADMIN — VENLAFAXINE 75 MG: 75 TABLET ORAL at 07:30

## 2021-03-23 RX ADMIN — MIRTAZAPINE 15 MG: 15 TABLET, FILM COATED ORAL at 00:04

## 2021-03-23 RX ADMIN — HYDROCODONE BITARTRATE AND ACETAMINOPHEN 1 TABLET: 5; 325 TABLET ORAL at 04:21

## 2021-03-23 RX ADMIN — NICARDIPINE HYDROCHLORIDE 10 MG/HR: 0.1 INJECTION, SOLUTION INTRAVENOUS at 02:23

## 2021-03-23 RX ADMIN — AMLODIPINE BESYLATE 10 MG: 10 TABLET ORAL at 09:02

## 2021-03-23 RX ADMIN — NICARDIPINE HYDROCHLORIDE 10 MG/HR: 0.1 INJECTION, SOLUTION INTRAVENOUS at 06:51

## 2021-03-23 RX ADMIN — NICARDIPINE HYDROCHLORIDE 10 MG/HR: 0.1 INJECTION, SOLUTION INTRAVENOUS at 00:00

## 2021-03-23 RX ADMIN — SODIUM CHLORIDE, PRESERVATIVE FREE 10 ML: 5 INJECTION INTRAVENOUS at 09:03

## 2021-03-23 RX ADMIN — HYDROCODONE BITARTRATE AND ACETAMINOPHEN 1 TABLET: 5; 325 TABLET ORAL at 11:27

## 2021-04-02 ENCOUNTER — HOSPITAL ENCOUNTER (EMERGENCY)
Facility: HOSPITAL | Age: 56
Discharge: ANOTHER HOSPITAL | End: 2021-04-02
Attending: EMERGENCY MEDICINE | Admitting: EMERGENCY MEDICINE

## 2021-04-02 VITALS
HEIGHT: 70 IN | DIASTOLIC BLOOD PRESSURE: 101 MMHG | RESPIRATION RATE: 18 BRPM | SYSTOLIC BLOOD PRESSURE: 191 MMHG | HEART RATE: 72 BPM | TEMPERATURE: 99 F | BODY MASS INDEX: 31.5 KG/M2 | WEIGHT: 220 LBS | OXYGEN SATURATION: 98 %

## 2021-04-02 DIAGNOSIS — F32.A DEPRESSIVE EPISODE: Primary | ICD-10-CM

## 2021-04-02 DIAGNOSIS — T50.902A INTENTIONAL OVERDOSE OF DRUG IN TABLET FORM (HCC): ICD-10-CM

## 2021-04-02 LAB
ALBUMIN SERPL-MCNC: 4.2 G/DL (ref 3.5–5.2)
ALBUMIN/GLOB SERPL: 1.6 G/DL
ALP SERPL-CCNC: 54 U/L (ref 39–117)
ALT SERPL W P-5'-P-CCNC: 20 U/L (ref 1–41)
AMPHET+METHAMPHET UR QL: NEGATIVE
AMPHETAMINES UR QL: NEGATIVE
ANION GAP SERPL CALCULATED.3IONS-SCNC: 9 MMOL/L (ref 5–15)
APAP SERPL-MCNC: <5 MCG/ML (ref 0–30)
AST SERPL-CCNC: 23 U/L (ref 1–40)
BARBITURATES UR QL SCN: NEGATIVE
BASOPHILS # BLD AUTO: 0.02 10*3/MM3 (ref 0–0.2)
BASOPHILS NFR BLD AUTO: 0.3 % (ref 0–1.5)
BENZODIAZ UR QL SCN: NEGATIVE
BILIRUB SERPL-MCNC: 0.5 MG/DL (ref 0–1.2)
BUN SERPL-MCNC: 17 MG/DL (ref 6–20)
BUN/CREAT SERPL: 13 (ref 7–25)
BUPRENORPHINE SERPL-MCNC: NEGATIVE NG/ML
CALCIUM SPEC-SCNC: 9.6 MG/DL (ref 8.6–10.5)
CANNABINOIDS SERPL QL: NEGATIVE
CHLORIDE SERPL-SCNC: 104 MMOL/L (ref 98–107)
CO2 SERPL-SCNC: 25 MMOL/L (ref 22–29)
COCAINE UR QL: NEGATIVE
CREAT SERPL-MCNC: 1.31 MG/DL (ref 0.76–1.27)
DEPRECATED RDW RBC AUTO: 42.5 FL (ref 37–54)
EOSINOPHIL # BLD AUTO: 0.11 10*3/MM3 (ref 0–0.4)
EOSINOPHIL NFR BLD AUTO: 1.7 % (ref 0.3–6.2)
ERYTHROCYTE [DISTWIDTH] IN BLOOD BY AUTOMATED COUNT: 13.5 % (ref 12.3–15.4)
ETHANOL BLD-MCNC: <10 MG/DL (ref 0–10)
GFR SERPL CREATININE-BSD FRML MDRD: 57 ML/MIN/1.73
GLOBULIN UR ELPH-MCNC: 2.6 GM/DL
GLUCOSE SERPL-MCNC: 112 MG/DL (ref 65–99)
HCT VFR BLD AUTO: 42.3 % (ref 37.5–51)
HGB BLD-MCNC: 14.5 G/DL (ref 13–17.7)
HOLD SPECIMEN: NORMAL
IMM GRANULOCYTES # BLD AUTO: 0.02 10*3/MM3 (ref 0–0.05)
IMM GRANULOCYTES NFR BLD AUTO: 0.3 % (ref 0–0.5)
LYMPHOCYTES # BLD AUTO: 1.02 10*3/MM3 (ref 0.7–3.1)
LYMPHOCYTES NFR BLD AUTO: 15.5 % (ref 19.6–45.3)
MCH RBC QN AUTO: 29.7 PG (ref 26.6–33)
MCHC RBC AUTO-ENTMCNC: 34.3 G/DL (ref 31.5–35.7)
MCV RBC AUTO: 86.7 FL (ref 79–97)
METHADONE UR QL SCN: NEGATIVE
MONOCYTES # BLD AUTO: 0.42 10*3/MM3 (ref 0.1–0.9)
MONOCYTES NFR BLD AUTO: 6.4 % (ref 5–12)
NEUTROPHILS NFR BLD AUTO: 4.99 10*3/MM3 (ref 1.7–7)
NEUTROPHILS NFR BLD AUTO: 75.8 % (ref 42.7–76)
NRBC BLD AUTO-RTO: 0 /100 WBC (ref 0–0.2)
OPIATES UR QL: NEGATIVE
OXYCODONE UR QL SCN: NEGATIVE
PCP UR QL SCN: NEGATIVE
PLATELET # BLD AUTO: 282 10*3/MM3 (ref 140–450)
PMV BLD AUTO: 8.3 FL (ref 6–12)
POTASSIUM SERPL-SCNC: 4.1 MMOL/L (ref 3.5–5.2)
PROPOXYPH UR QL: NEGATIVE
PROT SERPL-MCNC: 6.8 G/DL (ref 6–8.5)
QT INTERVAL: 360 MS
QT INTERVAL: 392 MS
QT INTERVAL: 400 MS
QTC INTERVAL: 452 MS
QTC INTERVAL: 464 MS
QTC INTERVAL: 482 MS
RBC # BLD AUTO: 4.88 10*6/MM3 (ref 4.14–5.8)
SALICYLATES SERPL-MCNC: <0.3 MG/DL
SODIUM SERPL-SCNC: 138 MMOL/L (ref 136–145)
TRICYCLICS UR QL SCN: NEGATIVE
WBC # BLD AUTO: 6.58 10*3/MM3 (ref 3.4–10.8)
WHOLE BLOOD HOLD SPECIMEN: NORMAL
WHOLE BLOOD HOLD SPECIMEN: NORMAL

## 2021-04-02 PROCEDURE — 80053 COMPREHEN METABOLIC PANEL: CPT | Performed by: EMERGENCY MEDICINE

## 2021-04-02 PROCEDURE — 93005 ELECTROCARDIOGRAM TRACING: CPT | Performed by: PHYSICIAN ASSISTANT

## 2021-04-02 PROCEDURE — 99284 EMERGENCY DEPT VISIT MOD MDM: CPT

## 2021-04-02 PROCEDURE — 36415 COLL VENOUS BLD VENIPUNCTURE: CPT

## 2021-04-02 PROCEDURE — 80306 DRUG TEST PRSMV INSTRMNT: CPT | Performed by: EMERGENCY MEDICINE

## 2021-04-02 PROCEDURE — 85025 COMPLETE CBC W/AUTO DIFF WBC: CPT | Performed by: EMERGENCY MEDICINE

## 2021-04-02 PROCEDURE — 80179 DRUG ASSAY SALICYLATE: CPT | Performed by: EMERGENCY MEDICINE

## 2021-04-02 PROCEDURE — 82077 ASSAY SPEC XCP UR&BREATH IA: CPT | Performed by: EMERGENCY MEDICINE

## 2021-04-02 PROCEDURE — 80143 DRUG ASSAY ACETAMINOPHEN: CPT | Performed by: EMERGENCY MEDICINE

## 2021-04-02 RX ORDER — AMLODIPINE BESYLATE 10 MG/1
10 TABLET ORAL
Status: DISCONTINUED | OUTPATIENT
Start: 2021-04-02 | End: 2021-04-02 | Stop reason: HOSPADM

## 2021-04-02 RX ORDER — CLONIDINE HYDROCHLORIDE 0.1 MG/1
0.1 TABLET ORAL ONCE
Status: COMPLETED | OUTPATIENT
Start: 2021-04-02 | End: 2021-04-02

## 2021-04-02 RX ORDER — VILAZODONE HYDROCHLORIDE 10 MG/1
TABLET ORAL DAILY
COMMUNITY

## 2021-04-02 RX ORDER — SODIUM CHLORIDE 0.9 % (FLUSH) 0.9 %
10 SYRINGE (ML) INJECTION AS NEEDED
Status: DISCONTINUED | OUTPATIENT
Start: 2021-04-02 | End: 2021-04-02 | Stop reason: HOSPADM

## 2021-04-02 RX ORDER — HYDROCHLOROTHIAZIDE 25 MG/1
25 TABLET ORAL ONCE
Status: DISCONTINUED | OUTPATIENT
Start: 2021-04-02 | End: 2021-04-02 | Stop reason: HOSPADM

## 2021-04-02 RX ORDER — TRAMADOL HYDROCHLORIDE 50 MG/1
50 TABLET ORAL ONCE
Status: COMPLETED | OUTPATIENT
Start: 2021-04-02 | End: 2021-04-02

## 2021-04-02 RX ADMIN — CLONIDINE HYDROCHLORIDE 0.1 MG: 0.1 TABLET ORAL at 15:01

## 2021-04-02 RX ADMIN — AMLODIPINE BESYLATE 10 MG: 10 TABLET ORAL at 16:59

## 2021-04-02 RX ADMIN — TRAMADOL HYDROCHLORIDE 50 MG: 50 TABLET, FILM COATED ORAL at 13:29

## 2021-04-02 NOTE — ED PROVIDER NOTES
" EMERGENCY DEPARTMENT ENCOUNTER    Pt Name: Jesus Soria  MRN: [id number]  Pt :   1965  Room Number:    Date of encounter:  2021  PCP: Jessica Segura MD  ED Provider: Timothy Grimm PA-C    Historian: Patient      HPI:  Chief Complaint: Overdose on Remeron yesterday, suicide attempt, depression        Context: Jesus Soria is a 55 y.o. male who presents to the ED c/o intentionally taking 600 mg of Remeron yesterday in an attempt to \"escape\".  He overtly denies suicidal or homicidal ideation, but said \"I do not know what I was doing\".  He has a depressed affect, long history of OCD anxiety disorder alcoholism.  He denies alcohol or drug use currently.  Today he is worried about his liver function, he denies chest pain arm neck jaw shoulder pain, yellowing of the sclera, dark bear or tea colored urine, orange rui colored or pale feces.  No jaundice.  No decreased urine output.  No nausea vomiting or diarrhea.  No headache vision changes photophobia seizures syncope or lightheadedness.      PAST MEDICAL HISTORY  Active Ambulatory Problems     Diagnosis Date Noted   • Medical non-compliance 2021   • OCD (obsessive compulsive disorder) 2021   • Anxiety disorder 2021   • Dental caries 2021   • Alcoholism (CMS/HCC) 2021   • Hyponatremia 2021     Resolved Ambulatory Problems     Diagnosis Date Noted   • Hypertensive urgency 2021     Past Medical History:   Diagnosis Date   • Hypertension          PAST SURGICAL HISTORY  No past surgical history on file.      FAMILY HISTORY  No family history on file.      SOCIAL HISTORY  Social History     Socioeconomic History   • Marital status: Single     Spouse name: Not on file   • Number of children: Not on file   • Years of education: Not on file   • Highest education level: Not on file   Tobacco Use   • Smoking status: Former Smoker   Substance and Sexual Activity   • Alcohol use: Yes     " Alcohol/week: 3.0 standard drinks     Types: 3 Shots of liquor per week   • Drug use: Not Currently         ALLERGIES  Amoxicillin        REVIEW OF SYSTEMS  Review of Systems   Constitutional: Negative for activity change, appetite change, chills, fatigue and fever.   HENT: Negative for congestion, rhinorrhea and sore throat.    Respiratory: Negative for cough and wheezing.    All other systems reviewed and are negative.       All systems reviewed and negative except for those discussed in HPI.       PHYSICAL EXAM    I have reviewed the triage vital signs and nursing notes.    ED Triage Vitals [04/02/21 1115]   Temp Heart Rate Resp BP SpO2   99 °F (37.2 °C) 90 18 (!) 199/119 99 %      Temp src Heart Rate Source Patient Position BP Location FiO2 (%)   Oral Monitor Sitting Right arm --       Physical Exam  GENERAL:   Depressed affect, hemodynamically stable, hypertensive, but not in acute distress.  HENT: Nares patent.  PERRL EOMI sclera and conjunctive are clear.  EYES: No scleral icterus.  CV: Regular rhythm, regular rate.  RESPIRATORY: Normal effort.  No audible wheezes, rales or rhonchi.  ABDOMEN: Soft, nontender.  Bowel sounds are normal.  No palpable organomegaly or pulsatile masses.  MUSCULOSKELETAL: No deformities.   NEURO: Alert, moves all extremities, follows commands.  SKIN: Warm, dry, no rash visualized.        LAB RESULTS  Recent Results (from the past 24 hour(s))   Comprehensive Metabolic Panel    Collection Time: 04/02/21 11:47 AM    Specimen: Blood   Result Value Ref Range    Glucose 112 (H) 65 - 99 mg/dL    BUN 17 6 - 20 mg/dL    Creatinine 1.31 (H) 0.76 - 1.27 mg/dL    Sodium 138 136 - 145 mmol/L    Potassium 4.1 3.5 - 5.2 mmol/L    Chloride 104 98 - 107 mmol/L    CO2 25.0 22.0 - 29.0 mmol/L    Calcium 9.6 8.6 - 10.5 mg/dL    Total Protein 6.8 6.0 - 8.5 g/dL    Albumin 4.20 3.50 - 5.20 g/dL    ALT (SGPT) 20 1 - 41 U/L    AST (SGOT) 23 1 - 40 U/L    Alkaline Phosphatase 54 39 - 117 U/L    Total  Bilirubin 0.5 0.0 - 1.2 mg/dL    eGFR Non African Amer 57 (L) >60 mL/min/1.73    Globulin 2.6 gm/dL    A/G Ratio 1.6 g/dL    BUN/Creatinine Ratio 13.0 7.0 - 25.0    Anion Gap 9.0 5.0 - 15.0 mmol/L   Acetaminophen Level    Collection Time: 04/02/21 11:47 AM    Specimen: Blood   Result Value Ref Range    Acetaminophen <5.0 0.0 - 30.0 mcg/mL   Ethanol    Collection Time: 04/02/21 11:47 AM    Specimen: Blood   Result Value Ref Range    Ethanol <10 0 - 10 mg/dL   Salicylate Level    Collection Time: 04/02/21 11:47 AM    Specimen: Blood   Result Value Ref Range    Salicylate <0.3 <=30.0 mg/dL   Light Blue Top    Collection Time: 04/02/21 11:47 AM   Result Value Ref Range    Extra Tube hold for add-on    Green Top (Gel)    Collection Time: 04/02/21 11:47 AM   Result Value Ref Range    Extra Tube Hold for add-ons.    Lavender Top    Collection Time: 04/02/21 11:47 AM   Result Value Ref Range    Extra Tube     Gold Top - SST    Collection Time: 04/02/21 11:47 AM   Result Value Ref Range    Extra Tube Hold for add-ons.    Gray Top - Ice    Collection Time: 04/02/21 11:47 AM   Result Value Ref Range    Extra Tube Hold for add-ons.    CBC Auto Differential    Collection Time: 04/02/21 11:47 AM    Specimen: Blood   Result Value Ref Range    WBC 6.58 3.40 - 10.80 10*3/mm3    RBC 4.88 4.14 - 5.80 10*6/mm3    Hemoglobin 14.5 13.0 - 17.7 g/dL    Hematocrit 42.3 37.5 - 51.0 %    MCV 86.7 79.0 - 97.0 fL    MCH 29.7 26.6 - 33.0 pg    MCHC 34.3 31.5 - 35.7 g/dL    RDW 13.5 12.3 - 15.4 %    RDW-SD 42.5 37.0 - 54.0 fl    MPV 8.3 6.0 - 12.0 fL    Platelets 282 140 - 450 10*3/mm3    Neutrophil % 75.8 42.7 - 76.0 %    Lymphocyte % 15.5 (L) 19.6 - 45.3 %    Monocyte % 6.4 5.0 - 12.0 %    Eosinophil % 1.7 0.3 - 6.2 %    Basophil % 0.3 0.0 - 1.5 %    Immature Grans % 0.3 0.0 - 0.5 %    Neutrophils, Absolute 4.99 1.70 - 7.00 10*3/mm3    Lymphocytes, Absolute 1.02 0.70 - 3.10 10*3/mm3    Monocytes, Absolute 0.42 0.10 - 0.90 10*3/mm3     Eosinophils, Absolute 0.11 0.00 - 0.40 10*3/mm3    Basophils, Absolute 0.02 0.00 - 0.20 10*3/mm3    Immature Grans, Absolute 0.02 0.00 - 0.05 10*3/mm3    nRBC 0.0 0.0 - 0.2 /100 WBC   Urine Drug Screen - Urine, Clean Catch    Collection Time: 04/02/21 12:28 PM    Specimen: Urine, Clean Catch   Result Value Ref Range    THC, Screen, Urine Negative Negative    Phencyclidine (PCP), Urine Negative Negative    Cocaine Screen, Urine Negative Negative    Methamphetamine, Ur Negative Negative    Opiate Screen Negative Negative    Amphetamine Screen, Urine Negative Negative    Benzodiazepine Screen, Urine Negative Negative    Tricyclic Antidepressants Screen Negative Negative    Methadone Screen, Urine Negative Negative    Barbiturates Screen, Urine Negative Negative    Oxycodone Screen, Urine Negative Negative    Propoxyphene Screen Negative Negative    Buprenorphine, Screen, Urine Negative Negative   ECG 12 Lead    Collection Time: 04/02/21  1:16 PM   Result Value Ref Range    QT Interval 400 ms    QTC Interval 452 ms       If labs were ordered, I independently reviewed the results.        RADIOLOGY  No Radiology Exams Resulted Within Past 24 Hours      PROCEDURES    Procedures    ECG 12 Lead   Final Result   Test Reason : CP   Blood Pressure : **/** mmHG   Vent. Rate : 077 BPM     Atrial Rate : 077 BPM      P-R Int : 152 ms          QRS Dur : 092 ms       QT Int : 400 ms       P-R-T Axes : 013 -35 084 degrees      QTc Int : 452 ms      Normal sinus rhythm   Left axis deviation   Abnormal ECG   When compared with ECG of 22-MAR-2021 18:53,   No significant change was found   Confirmed by TEODORA NUÑEZ MD ([id number]) on 4/2/2021 2:16:22 PM      Referred By:  FABI MEHTA           Confirmed By:TEODORA NUÑEZ MD          MEDICATIONS GIVEN IN ER    Medications   sodium chloride 0.9 % flush 10 mL (has no administration in time range)   amLODIPine (NORVASC) tablet 10 mg (has no administration in time range)   hydroCHLOROthiazide  (HYDRODIURIL) tablet 25 mg (has no administration in time range)   traMADol (ULTRAM) tablet 50 mg (50 mg Oral Given 4/2/21 1329)   cloNIDine (CATAPRES) tablet 0.1 mg (0.1 mg Oral Given 4/2/21 1501)         PROGRESS, DATA ANALYSIS, CONSULTS, AND MEDICAL DECISION MAKING    All labs have been independently reviewed by me.  All radiology studies have been reviewed by me and the radiologist dictating the report.   EKG's have been independently viewed and interpreted by me.                       AS OF 16:57 EDT VITALS:    BP - (!) 187/107  HR - 75  TEMP - 99 °F (37.2 °C) (Oral)  O2 SATS - 98%        DIAGNOSIS  Final diagnoses:   Depressive episode   Intentional overdose of drug in tablet form (CMS/[facility])         DISPOSITION  Transfer to [facility]           Alfa, Timothy Taveras PA-C  04/02/21 4024

## 2021-04-02 NOTE — ED NOTES
Pt anxiety is bad and hard for him to wear his mask. Cleared with charge to sit outside glass door.     Sofi White  04/02/21 1142

## 2021-04-02 NOTE — ED NOTES
Pt emptied pockets out of belongings and placed in a green belongings bag. Removed wires and devices from room except for monitor leads due to monitoring pts vitals     Sofi White  04/02/21 1204

## 2021-04-02 NOTE — ED NOTES
Pt unable to urinate after 2 attempts due to he has prostate issues     Sofi White  04/02/21 2878

## 2021-11-23 ENCOUNTER — HOSPITAL ENCOUNTER (EMERGENCY)
Facility: HOSPITAL | Age: 56
Discharge: PSYCHIATRIC OR SUBSTANCE USE FACILITY | End: 2021-11-24
Attending: STUDENT IN AN ORGANIZED HEALTH CARE EDUCATION/TRAINING PROGRAM | Admitting: STUDENT IN AN ORGANIZED HEALTH CARE EDUCATION/TRAINING PROGRAM

## 2021-11-23 DIAGNOSIS — F22 PARANOID BEHAVIOR (HCC): Primary | ICD-10-CM

## 2021-11-23 DIAGNOSIS — R45.851 SUICIDAL IDEATIONS: ICD-10-CM

## 2021-11-23 PROCEDURE — 36415 COLL VENOUS BLD VENIPUNCTURE: CPT

## 2021-11-23 PROCEDURE — 93005 ELECTROCARDIOGRAM TRACING: CPT | Performed by: PHYSICIAN ASSISTANT

## 2021-11-23 PROCEDURE — 82077 ASSAY SPEC XCP UR&BREATH IA: CPT | Performed by: PHYSICIAN ASSISTANT

## 2021-11-23 PROCEDURE — 99285 EMERGENCY DEPT VISIT HI MDM: CPT

## 2021-11-23 PROCEDURE — 85025 COMPLETE CBC W/AUTO DIFF WBC: CPT | Performed by: PHYSICIAN ASSISTANT

## 2021-11-23 PROCEDURE — 80053 COMPREHEN METABOLIC PANEL: CPT | Performed by: PHYSICIAN ASSISTANT

## 2021-11-23 PROCEDURE — 87636 SARSCOV2 & INF A&B AMP PRB: CPT | Performed by: PHYSICIAN ASSISTANT

## 2021-11-23 PROCEDURE — 80179 DRUG ASSAY SALICYLATE: CPT | Performed by: PHYSICIAN ASSISTANT

## 2021-11-23 PROCEDURE — C9803 HOPD COVID-19 SPEC COLLECT: HCPCS

## 2021-11-23 PROCEDURE — 80143 DRUG ASSAY ACETAMINOPHEN: CPT | Performed by: PHYSICIAN ASSISTANT

## 2021-11-24 VITALS
TEMPERATURE: 98.5 F | HEART RATE: 90 BPM | RESPIRATION RATE: 18 BRPM | BODY MASS INDEX: 26.48 KG/M2 | WEIGHT: 185 LBS | OXYGEN SATURATION: 98 % | DIASTOLIC BLOOD PRESSURE: 95 MMHG | HEIGHT: 70 IN | SYSTOLIC BLOOD PRESSURE: 169 MMHG

## 2021-11-24 LAB
ALBUMIN SERPL-MCNC: 4.4 G/DL (ref 3.5–5.2)
ALBUMIN/GLOB SERPL: 1.8 G/DL
ALP SERPL-CCNC: 58 U/L (ref 39–117)
ALT SERPL W P-5'-P-CCNC: 10 U/L (ref 1–41)
AMPHET+METHAMPHET UR QL: NEGATIVE
AMPHETAMINES UR QL: NEGATIVE
ANION GAP SERPL CALCULATED.3IONS-SCNC: 16 MMOL/L (ref 5–15)
APAP SERPL-MCNC: <5 MCG/ML (ref 0–30)
AST SERPL-CCNC: 19 U/L (ref 1–40)
BACTERIA UR QL AUTO: NORMAL /HPF
BARBITURATES UR QL SCN: NEGATIVE
BASOPHILS # BLD AUTO: 0.01 10*3/MM3 (ref 0–0.2)
BASOPHILS NFR BLD AUTO: 0.1 % (ref 0–1.5)
BENZODIAZ UR QL SCN: NEGATIVE
BILIRUB SERPL-MCNC: 0.8 MG/DL (ref 0–1.2)
BILIRUB UR QL STRIP: NEGATIVE
BUN SERPL-MCNC: 22 MG/DL (ref 6–20)
BUN/CREAT SERPL: 23.2 (ref 7–25)
BUPRENORPHINE SERPL-MCNC: NEGATIVE NG/ML
CALCIUM SPEC-SCNC: 9.4 MG/DL (ref 8.6–10.5)
CANNABINOIDS SERPL QL: NEGATIVE
CHLORIDE SERPL-SCNC: 104 MMOL/L (ref 98–107)
CLARITY UR: CLEAR
CO2 SERPL-SCNC: 22 MMOL/L (ref 22–29)
COCAINE UR QL: NEGATIVE
COLOR UR: YELLOW
CREAT SERPL-MCNC: 0.95 MG/DL (ref 0.76–1.27)
DEPRECATED RDW RBC AUTO: 39.2 FL (ref 37–54)
EOSINOPHIL # BLD AUTO: 0 10*3/MM3 (ref 0–0.4)
EOSINOPHIL NFR BLD AUTO: 0 % (ref 0.3–6.2)
ERYTHROCYTE [DISTWIDTH] IN BLOOD BY AUTOMATED COUNT: 12.7 % (ref 12.3–15.4)
ETHANOL BLD-MCNC: <10 MG/DL (ref 0–10)
FLUAV SUBTYP SPEC NAA+PROBE: NOT DETECTED
FLUBV RNA ISLT QL NAA+PROBE: NOT DETECTED
GFR SERPL CREATININE-BSD FRML MDRD: 82 ML/MIN/1.73
GLOBULIN UR ELPH-MCNC: 2.4 GM/DL
GLUCOSE SERPL-MCNC: 113 MG/DL (ref 65–99)
GLUCOSE UR STRIP-MCNC: NEGATIVE MG/DL
HCT VFR BLD AUTO: 43.1 % (ref 37.5–51)
HGB BLD-MCNC: 15 G/DL (ref 13–17.7)
HGB UR QL STRIP.AUTO: ABNORMAL
HYALINE CASTS UR QL AUTO: NORMAL /LPF
IMM GRANULOCYTES # BLD AUTO: 0.04 10*3/MM3 (ref 0–0.05)
IMM GRANULOCYTES NFR BLD AUTO: 0.4 % (ref 0–0.5)
KETONES UR QL STRIP: ABNORMAL
LEUKOCYTE ESTERASE UR QL STRIP.AUTO: NEGATIVE
LYMPHOCYTES # BLD AUTO: 1.32 10*3/MM3 (ref 0.7–3.1)
LYMPHOCYTES NFR BLD AUTO: 11.7 % (ref 19.6–45.3)
MCH RBC QN AUTO: 29.9 PG (ref 26.6–33)
MCHC RBC AUTO-ENTMCNC: 34.8 G/DL (ref 31.5–35.7)
MCV RBC AUTO: 86 FL (ref 79–97)
METHADONE UR QL SCN: NEGATIVE
MONOCYTES # BLD AUTO: 0.79 10*3/MM3 (ref 0.1–0.9)
MONOCYTES NFR BLD AUTO: 7 % (ref 5–12)
NEUTROPHILS NFR BLD AUTO: 80.8 % (ref 42.7–76)
NEUTROPHILS NFR BLD AUTO: 9.16 10*3/MM3 (ref 1.7–7)
NITRITE UR QL STRIP: NEGATIVE
NRBC BLD AUTO-RTO: 0 /100 WBC (ref 0–0.2)
OPIATES UR QL: NEGATIVE
OXYCODONE UR QL SCN: NEGATIVE
PCP UR QL SCN: NEGATIVE
PH UR STRIP.AUTO: 5.5 [PH] (ref 5–8)
PLATELET # BLD AUTO: 226 10*3/MM3 (ref 140–450)
PMV BLD AUTO: 8.8 FL (ref 6–12)
POTASSIUM SERPL-SCNC: 3.6 MMOL/L (ref 3.5–5.2)
PROPOXYPH UR QL: NEGATIVE
PROT SERPL-MCNC: 6.8 G/DL (ref 6–8.5)
PROT UR QL STRIP: ABNORMAL
QT INTERVAL: 400 MS
QTC INTERVAL: 472 MS
RBC # BLD AUTO: 5.01 10*6/MM3 (ref 4.14–5.8)
RBC # UR STRIP: NORMAL /HPF
REF LAB TEST METHOD: NORMAL
SALICYLATES SERPL-MCNC: <0.3 MG/DL
SARS-COV-2 RNA PNL SPEC NAA+PROBE: NOT DETECTED
SODIUM SERPL-SCNC: 142 MMOL/L (ref 136–145)
SP GR UR STRIP: 1.03 (ref 1–1.03)
SQUAMOUS #/AREA URNS HPF: NORMAL /HPF
TRICYCLICS UR QL SCN: NEGATIVE
UROBILINOGEN UR QL STRIP: ABNORMAL
WBC # UR STRIP: NORMAL /HPF
WBC NRBC COR # BLD: 11.32 10*3/MM3 (ref 3.4–10.8)

## 2021-11-24 PROCEDURE — 80306 DRUG TEST PRSMV INSTRMNT: CPT | Performed by: PHYSICIAN ASSISTANT

## 2021-11-24 PROCEDURE — 81001 URINALYSIS AUTO W/SCOPE: CPT | Performed by: PHYSICIAN ASSISTANT

## 2021-11-24 NOTE — CONSULTS
"0745:  Received case at hand off from Earline Mejía [facility].  Pt here with paranoid delusions, disorganized thought and hypersexual fixations endorsing SI.  Pt has signed TRINA for multiple facilities.  Per Earline, pt has been denied by Dr. Alvarado at [facility] and Lian at [facility] reports they cannot accommodate patient due to bed availability.    0815: Called to speak with Charmaine with [facility] for an update. Charmaine could not find the previously faxed referral.  Therapist advised it was confirmed as received by their staff named Tracy.  Charmaine then stated she found the referral and she would call back \"in a little while, after I get myself together.\"      0820:  [facility] and [facility] and Giuliana at [facility] who both report they should have bed availability between 1000 and 1100 and successfully faxed referral to both facilities.    0830:  Spoke to HUMBLE Evans to provide updated.  I am hopeful we find placement at [facility], [facility] or [facility].  Per HUMBLE Evans, patient's delusions and disorganized thoughts have not subsided and the patient does not feel safe to discharge.  Pt requested to be placed on a hold earlier during his ED stay. If additional referrals are denied, Nathan and I plan to discuss with the newly assigned attending provider about disposition planning.      1000:  Per Lian with [facility], they will not know about beds until after 1300.  Tricia with [facility] reports they have reviewed clinicals and they are presenting to their attending shortly and will call back.  Staffed with HUMBLE Evans and Timothy CAUSEY PA-C who agree if declined admission, to avoid continuing delay in care and for patient's safety, will contact SW and present petition to courts for review.  Vidal ECHEVARRIA, ED Director updated as well.    -Madyson Abarham [facility]  "

## 2021-11-24 NOTE — ED NOTES
1115:  Per Chichi with [facility], Mr. Soria is accepted as a direct admit under Dr. Alamo.  Saurabh, RN updated and given information to call nursing report to unit [facility] ([phone]).  Timothy Grimm PA-C updated and agreeable for transfer.  [facility] called for [facility]  Pt to transfer on their arrival.    -Madyson Abraham [facility]     Madyson Abraham [facility]  11/24/21 1131

## 2021-11-24 NOTE — CONSULTS
"Jesus Feliz Yang  1965     This provider is located at the [facility] located at [address], [address] using a secure Zoom Video Visit. Patient is being seen remotely via telehealth at [address], [address], and stated they are in a secure environment for this session. The patient's condition being diagnosed/treated is appropriate for telemedicine. The provider identified themselves as well as their credentials.   The patient, and/or patients guardian, consent to be seen remotely, and when consent is given they understand that the consent allows for patient identifiable information to be sent to a third party as needed.   They may refuse to be seen remotely at any time. The electronic data is encrypted and password protected, and the patient and/or guardian has been advised of the potential risks to privacy not withstanding such measures.      TIME: 350-420    Is patient agreeable to admission/treatment? Yes    Guardian: self    Pt Lives With:  Patient has been homeless for 6 months    Presenting Problems: Patient brought to ED via EMS for knee pain but reported SI and concern his penis would get cut off. Patient appears paranoid [facility] staff are making false claims he is a homosexual and this will greatly impact his future. Patient expressed concern he will go to long-term based on these false claims. Patient states he \"would rather be dead instead of being portrayed as a homosexual.\" Patient also endorsing SI \"in this situation I am facing.\" Patient denies current method/plan. He reports wanting to reestablish relationship with sister and if that does not go accordingly he would try to kill himself.     Current Stressors: housing  concerns and mental health condition, patient believes rumors are being spread about him that will impact his future    Depression: 10     Anxiety: 9    Previous Psychiatric Treatment: Yes, patient states he has been hospitalized 7 " "times in his life. Most recent hospitalization was March 2021. Patient can only identify [facility] where he was hospitalized.  Patient does not receive outpatient therapy at this time or medication management.    Last inpatient admission: March 2021    Number of admissions: 7    Last outpatient visit: Unknown    Suicidal: Present    Previous Attempts: more than 5  prior suicide attempts    Number of Previous Attempts: 6    Most Recent Attempt: Patient states, \"earlier this year.\"    COLUMBIA-SUICIDE SEVERITY RATING SCALE  Psychiatric Inpatient Setting - Discharge Screener    Ask questions that are bold and underlined Discharge   Ask Questions 1 and 2 YES NO   1) Wish to be Dead:   Person endorses thoughts about a wish to be dead or not alive anymore, or wish to fall asleep and not wake up.  While you were here in the hospital, have you wished you were dead or wished you could go to sleep and not wake up? X    2) Suicidal Thoughts:   General non-specific thoughts of wanting to end one's life/die by suicide, “I've thought about killing myself” without general thoughts of ways to kill oneself/associated methods, intent, or plan.   While you were here in the hospital, have you actually had thoughts about killing yourself?  X    If YES to 2, ask questions 3, 4, 5, and 6.  If NO to 2, go directly to question 6   3) Suicidal Thoughts with Method (without Specific Plan or Intent to Act):   Person endorses thoughts of suicide and has thought of a least one method during the assessment period. This is different than a specific plan with time, place or method details worked out. “I thought about taking an overdose but I never made a specific plan as to when where or how I would actually do it….and I would never go through with it.”   Have you been thinking about how you might kill yourself?   X   4) Suicidal Intent (without Specific Plan):   Active suicidal thoughts of killing oneself and patient reports having some intent to " act on such thoughts, as opposed to “I have the thoughts but I definitely will not do anything about them.”   Have you had these thoughts and had some intention of acting on them or do you have some intention of acting on them after you leave the hospital?   X   5) Suicide Intent with Specific Plan:   Thoughts of killing oneself with details of plan fully or partially worked out and person has some intent to carry it out.   Have you started to work out or worked out the details of how to kill yourself either for while you were here in the hospital or for after you leave the hospital? Do you intend to carry out this plan?   X     6) Suicide Behavior    While you were here in the hospital, have you done anything, started to do anything, or prepared to do anything to end your life?    Examples: Took pills, cut yourself, tried to hang yourself, took out pills but didn't swallow any because you changed your mind or someone took them from you, collected pills, secured a means of obtaining a gun, gave away valuables, wrote a will or suicide note, etc.  X       Family Hx of Mental Health/Substance Abuse: Unknown    Delusions: Patient appears paranoid [facility] staff are making false claims he is a homosexual and this will greatly impact his future. Patient expressed concern he will go to long-term based on these false claims.    Hallucinations: Patient denies.     Mood: Dysphoric    Homicidal Ideations: Absent     Abuse History: Patient does not disclose    Does this require reporting: N/A    Legal History / History of Violence: The patient has no current pending legal charges.     Sleep: Poor    Appetite: Poor    Current Medical Conditions: OCD, depression, anxiety, records indicate history of alcoholism    Current Psychiatric Medications: Remeron     History of Inappropriate Sexual Behavior: No    Hopelessness: yes    Orientation: alert and oriented to person, place, and time     Substance Abuse: Patient states he has not used drugs  "or alcohol in the last 7 or 8 months.  He reports history of \"minor marijuana use.\"  Records indicate history of alcohol abuse.  UDS is suggestively negative for illicit substances.      DATA:   This therapist received a call from [facility] staff Sukhdeep GALVAN for a behavioral health consult.  The patient serves as his own guardian and is agreeable to speak with me.  Met with patient at bedside/via Telehealth. Patient is under 1:1 security monitoring during assessment.  Patient is a 56 year old, not , , male residing in [place], [place]. Patient currently homeless. Patient brought to ED via EMS for knee pain but reported SI and concern his penis would get cut off.  Patient states his knee pain \"went away for a while but I do not know if it will come back.\"  Patient appears paranoid [facility] staff are making false claims he is a homosexual and this will greatly impact his future. Patient expressed concern he will go to assisted based on these false claims. Patient states he \"would rather be dead instead of be portrayed as a homosexual.\" Patient also endorsing SI \"in this situation I am facing.\" Patient denies current method/plan. He reports wanting to reestablish relationship with sister and if that does not go accordingly he would try to kill himself. Patient states he has been hospitalized 7 times in his life. Most recent hospitalization was March 2021. Patient can only identify [facility] where he was hospitalized.  Patient states he was hospitalized at [facility] for confusion however he does not agree with this diagnosis.  Patient does not receive outpatient therapy at this time or medication management. Patient states he has not used drugs or alcohol in the last 7 or 8 months.  He reports history of \"minor marijuana use.\"  Records indicate history of alcohol abuse.  UDS is suggestively negative for illicit substances.  Patient denies hallucinations.  He reports history of OCD, depression and anxiety.  Patient " reports feeling hopeless and worthless.    ASSESSMENT:    Therapist completed CSSRS with patient for suicide risk assessment.  The results of patient’s CSSRS suggest that patient is reporting death wish and SI without current method or plan.  He states that if he cannot successfully establish relationship with his sister he would try to kill himself.  Patient reports history of 6 suicide attempts, most recently earlier this year.  Patient holds attention and is Cooperative with assessment.  Patient’s appearance is disheveled, unkempt.  The patient displays Appropriate psychomotor behavior. The patient's affect appears flat. The patient is observed to have normal rate, tone and rhythm of speech.   Patient observed to have Good eye contact. The patient's displays poor insight, with poor impulse control and limited judgement.     PLAN:    At this time, therapist recommends referral to psychiatrist for recommendation on level of care. Patient reports to be agreeable to the treatment recommendations.  Therapist informed treatment team members, COLE Tarango who is agreeable to plan. Patient agreeable for referrals to be sent to [facility], [facility], [facility] and [facility]. Suzanne at [facility] reports they are on diversion. Intake at [facility] states they do not have beds available but advised therapist to send referral for review; therapist faxed appropriate paperwork to [facility]. Elisa at [facility] and HUMBLE Samuels [facility] agreeable to review referral. Disposition pending.     545: Per HUMBLE Samuels, patient denied by MD Alvarado at [facility]. Tracy at [facility] confirms fax was received and will be given to dayshift intake. Therapist to update day shift therapist of case.     Earline Mejía, [facility] 11/24/21

## 2021-11-24 NOTE — NURSING NOTE
R consult for possible psych admission to [facility]. Spoke with Dr. Alvarado declined admission at this time.

## 2022-09-14 ENCOUNTER — INPATIENT HOSPITAL (OUTPATIENT)
Dept: URBAN - METROPOLITAN AREA HOSPITAL 56 | Facility: HOSPITAL | Age: 57
End: 2022-09-14
Payer: MEDICAID

## 2022-09-14 DIAGNOSIS — R10.84 GENERALIZED ABDOMINAL PAIN: ICD-10-CM

## 2022-09-14 DIAGNOSIS — J44.9 CHRONIC OBSTRUCTIVE PULMONARY DISEASE, UNSPECIFIED: ICD-10-CM

## 2022-09-14 DIAGNOSIS — R93.3 ABNORMAL FINDINGS ON DIAGNOSTIC IMAGING OF OTHER PARTS OF DI: ICD-10-CM

## 2022-09-14 PROCEDURE — 99254 IP/OBS CNSLTJ NEW/EST MOD 60: CPT

## 2022-09-15 ENCOUNTER — INPATIENT HOSPITAL (OUTPATIENT)
Dept: URBAN - METROPOLITAN AREA HOSPITAL 56 | Facility: HOSPITAL | Age: 57
End: 2022-09-15
Payer: MEDICAID

## 2022-09-15 DIAGNOSIS — K31.89 OTHER DISEASES OF STOMACH AND DUODENUM: ICD-10-CM

## 2022-09-15 DIAGNOSIS — K25.9 GASTRIC ULCER, UNSPECIFIED AS ACUTE OR CHRONIC, WITHOUT HEMO: ICD-10-CM

## 2022-09-15 DIAGNOSIS — Z53.8 PROCEDURE AND TREATMENT NOT CARRIED OUT FOR OTHER REASONS: ICD-10-CM

## 2022-09-15 DIAGNOSIS — R10.13 EPIGASTRIC PAIN: ICD-10-CM

## 2022-09-15 DIAGNOSIS — R93.3 ABNORMAL FINDINGS ON DIAGNOSTIC IMAGING OF OTHER PARTS OF DI: ICD-10-CM

## 2022-09-15 PROCEDURE — 43239 EGD BIOPSY SINGLE/MULTIPLE: CPT | Performed by: INTERNAL MEDICINE

## 2022-09-15 PROCEDURE — 45378 DIAGNOSTIC COLONOSCOPY: CPT | Mod: 53 | Performed by: INTERNAL MEDICINE

## 2024-07-29 ENCOUNTER — APPOINTMENT (OUTPATIENT)
Dept: GENERAL RADIOLOGY | Age: 59
End: 2024-07-29
Payer: MEDICAID

## 2024-07-29 ENCOUNTER — HOSPITAL ENCOUNTER (EMERGENCY)
Age: 59
Discharge: NURSING, REHAB OR LONG-TERM CARE | End: 2024-07-30
Attending: EMERGENCY MEDICINE
Payer: MEDICAID

## 2024-07-29 VITALS
TEMPERATURE: 99.1 F | DIASTOLIC BLOOD PRESSURE: 95 MMHG | WEIGHT: 178.1 LBS | HEIGHT: 70 IN | BODY MASS INDEX: 25.5 KG/M2 | SYSTOLIC BLOOD PRESSURE: 152 MMHG | OXYGEN SATURATION: 99 % | RESPIRATION RATE: 16 BRPM | HEART RATE: 55 BPM

## 2024-07-29 DIAGNOSIS — I10 HYPERTENSION, UNSPECIFIED TYPE: Primary | ICD-10-CM

## 2024-07-29 LAB
ANION GAP SERPL CALCULATED.3IONS-SCNC: 11 MMOL/L (ref 3–16)
ANION GAP SERPL CALCULATED.3IONS-SCNC: 13 MMOL/L (ref 3–16)
BASOPHILS # BLD: 0 K/UL (ref 0–0.2)
BASOPHILS NFR BLD: 0.4 %
BUN SERPL-MCNC: 12 MG/DL (ref 7–20)
BUN SERPL-MCNC: 13 MG/DL (ref 7–20)
CALCIUM SERPL-MCNC: 8.5 MG/DL (ref 8.3–10.6)
CALCIUM SERPL-MCNC: 8.6 MG/DL (ref 8.3–10.6)
CHLORIDE SERPL-SCNC: 103 MMOL/L (ref 99–110)
CHLORIDE SERPL-SCNC: 91 MMOL/L (ref 99–110)
CO2 SERPL-SCNC: 22 MMOL/L (ref 21–32)
CO2 SERPL-SCNC: 23 MMOL/L (ref 21–32)
CREAT SERPL-MCNC: 0.6 MG/DL (ref 0.9–1.3)
CREAT SERPL-MCNC: 0.7 MG/DL (ref 0.9–1.3)
DEPRECATED RDW RBC AUTO: 13.5 % (ref 12.4–15.4)
EOSINOPHIL # BLD: 0.1 K/UL (ref 0–0.6)
EOSINOPHIL NFR BLD: 0.7 %
GFR SERPLBLD CREATININE-BSD FMLA CKD-EPI: >90 ML/MIN/{1.73_M2}
GFR SERPLBLD CREATININE-BSD FMLA CKD-EPI: >90 ML/MIN/{1.73_M2}
GLUCOSE SERPL-MCNC: 112 MG/DL (ref 70–99)
GLUCOSE SERPL-MCNC: 93 MG/DL (ref 70–99)
HCT VFR BLD AUTO: 38.9 % (ref 40.5–52.5)
HGB BLD-MCNC: 13.6 G/DL (ref 13.5–17.5)
LACTATE BLDV-SCNC: 1.6 MMOL/L (ref 0.4–2)
LYMPHOCYTES # BLD: 2 K/UL (ref 1–5.1)
LYMPHOCYTES NFR BLD: 20.1 %
MCH RBC QN AUTO: 32.2 PG (ref 26–34)
MCHC RBC AUTO-ENTMCNC: 35.1 G/DL (ref 31–36)
MCV RBC AUTO: 91.8 FL (ref 80–100)
MONOCYTES # BLD: 0.6 K/UL (ref 0–1.3)
MONOCYTES NFR BLD: 5.7 %
NEUTROPHILS # BLD: 7.4 K/UL (ref 1.7–7.7)
NEUTROPHILS NFR BLD: 73.1 %
PLATELET # BLD AUTO: 185 K/UL (ref 135–450)
PMV BLD AUTO: 8.3 FL (ref 5–10.5)
POTASSIUM SERPL-SCNC: 3.8 MMOL/L (ref 3.5–5.1)
POTASSIUM SERPL-SCNC: 3.9 MMOL/L (ref 3.5–5.1)
RBC # BLD AUTO: 4.23 M/UL (ref 4.2–5.9)
SODIUM SERPL-SCNC: 126 MMOL/L (ref 136–145)
SODIUM SERPL-SCNC: 137 MMOL/L (ref 136–145)
WBC # BLD AUTO: 10.1 K/UL (ref 4–11)

## 2024-07-29 PROCEDURE — 2580000003 HC RX 258

## 2024-07-29 PROCEDURE — 99285 EMERGENCY DEPT VISIT HI MDM: CPT

## 2024-07-29 PROCEDURE — 83605 ASSAY OF LACTIC ACID: CPT

## 2024-07-29 PROCEDURE — 36415 COLL VENOUS BLD VENIPUNCTURE: CPT

## 2024-07-29 PROCEDURE — 85025 COMPLETE CBC W/AUTO DIFF WBC: CPT

## 2024-07-29 PROCEDURE — 71045 X-RAY EXAM CHEST 1 VIEW: CPT

## 2024-07-29 PROCEDURE — 93005 ELECTROCARDIOGRAM TRACING: CPT

## 2024-07-29 PROCEDURE — 80048 BASIC METABOLIC PNL TOTAL CA: CPT

## 2024-07-29 RX ORDER — 0.9 % SODIUM CHLORIDE 0.9 %
1000 INTRAVENOUS SOLUTION INTRAVENOUS ONCE
Status: COMPLETED | OUTPATIENT
Start: 2024-07-29 | End: 2024-07-29

## 2024-07-29 RX ADMIN — SODIUM CHLORIDE 1000 ML: 9 INJECTION, SOLUTION INTRAVENOUS at 20:20

## 2024-07-29 ASSESSMENT — PAIN - FUNCTIONAL ASSESSMENT: PAIN_FUNCTIONAL_ASSESSMENT: NONE - DENIES PAIN

## 2024-07-29 NOTE — TRANSITION OF CARE
[facility]  EMERGENCY DEPARTMENT ENCOUNTER          EM RESIDENT NOTE     Date of evaluation: 7/29/2024    ADDENDUM:      Care of this patient was assumed from Dr De Santiago.  The patient was seen for Hypertension (Facility sent for htn BP 170s systolic ) and Fall (Pt had fall at SNF, denies injury, denies loc, dizzy/lightheaded. Reports mechanical fall )    The patient's initial evaluation and plan have been discussed with the prior provider who initially evaluated the patient.  Nursing Notes, Past Medical Hx, Past Surgical Hx, Social Hx, Allergies, and Family Hx were all reviewed.    Past Medical History:  Alcohol dependence  Anxiety disorder   Bipolar disorder, unspecified  BPH without obstruction/lower urinary tract symptoms   Constipation   COPD (chronic obstructive pulmonary disease) (HCC)   Dental caries   Disorder of the skin and subcutaneous tissue, unspecified   Dysthymic disorder   Elevation of levels of liver transaminase levels   Gastro-esophageal reflux disease with esophagitis, without bleeding   History of UTI   HLD (hyperlipidemia)   HTN (hypertension)   Hypothyroidism   Insomnia   Major depressive disorder   Muscle wasting and atrophy, not elsewhere classified, left lower leg   Nicotine dependence   Noncompliance   Nontoxic thyroid nodule   Obsessive compulsive personality disorder (HCC)   Patellofemoral disorder   Personal history of COVID-19   Poisoning by multiple unspecified drugs, medicaments and biological substances, intentional self-harm, initial encounter (HCC)   Suicidal ideation   Vitamin D deficiency   Wernicke encephalopathy     MEDICAL DECISION MAKING / ASSESSMENT / PLAN     John Bella is a 59 y.o. male with a history of Tourette's who presents for concerns related to a fall and hypertension.  The initial provider conducted a thorough workup and elements of care assigned to me following handoff include awaiting contact from nursing home to establish patient's baseline prior

## 2024-07-29 NOTE — ED PROVIDER NOTES
ED Attending Attestation Note     Date of evaluation: 7/29/2024    This patient was seen by the resident.  I have seen and examined the patient, agree with the workup, evaluation, management and diagnosis. The care plan has been discussed.  My assessment reveals patient sent from SNF for high BP readings.BPs not very concerning here, asymptomatic.  Initially hyponatremic but on repeat was normal and stable for discharge.     Jason Page MD  07/31/24 0858

## 2024-07-29 NOTE — ED NOTES
Pt changed into clean brief and gown, linens changed as well. Pt placed on bed alarm.      Jaspreet Castro RN  07/29/24 3774

## 2024-07-29 NOTE — ED PROVIDER NOTES
THE [facility]  EMERGENCY DEPARTMENT ENCOUNTER          EM RESIDENT NOTE       Date of evaluation: 7/29/2024    Chief Complaint     Hypertension (Facility sent for htn BP 170s systolic ) and Fall (Pt had fall at SNF, denies injury, denies loc, dizzy/lightheaded. Reports mechanical fall )      History of Present Illness     John Bella is a 59 y.o. male with multiple psychiatric issues including Tourette syndrome and anxiety disorder who presents with concern for high blood pressure.  Per patient report he states he is here because of elevated blood pressure.  He however does not report any neurological findings.  Patient is responsive to interviewer but mostly responds in 1 or 2 word responses.  I attempted to reach out to the primary care provider of this patient along with the facility that he came from with no success.      Diagnostic Results and Other Data     RADIOLOGY:  XR CHEST PORTABLE   Final Result      No acute pulmonary disease.         Electronically signed by Jayesh Regalado          LABS:   Results for orders placed or performed during the hospital encounter of 07/29/24   CBC with Auto Differential   Result Value Ref Range    WBC 10.1 4.0 - 11.0 K/uL    RBC 4.23 4.20 - 5.90 M/uL    Hemoglobin 13.6 13.5 - 17.5 g/dL    Hematocrit 38.9 (L) 40.5 - 52.5 %    MCV 91.8 80.0 - 100.0 fL    MCH 32.2 26.0 - 34.0 pg    MCHC 35.1 31.0 - 36.0 g/dL    RDW 13.5 12.4 - 15.4 %    Platelets 185 135 - 450 K/uL    MPV 8.3 5.0 - 10.5 fL    Neutrophils % 73.1 %    Lymphocytes % 20.1 %    Monocytes % 5.7 %    Eosinophils % 0.7 %    Basophils % 0.4 %    Neutrophils Absolute 7.4 1.7 - 7.7 K/uL    Lymphocytes Absolute 2.0 1.0 - 5.1 K/uL    Monocytes Absolute 0.6 0.0 - 1.3 K/uL    Eosinophils Absolute 0.1 0.0 - 0.6 K/uL    Basophils Absolute 0.0 0.0 - 0.2 K/uL   BMP w/ Reflex to MG   Result Value Ref Range    Sodium 126 (L) 136 - 145 mmol/L    Potassium reflex Magnesium 3.9 3.5 - 5.1 mmol/L    Chloride 91 (L) 99 - 110 mmol/L

## 2024-07-30 LAB
EKG ATRIAL RATE: 70 BPM
EKG DIAGNOSIS: NORMAL
EKG P AXIS: 49 DEGREES
EKG P-R INTERVAL: 120 MS
EKG Q-T INTERVAL: 394 MS
EKG QRS DURATION: 86 MS
EKG QTC CALCULATION (BAZETT): 425 MS
EKG R AXIS: -26 DEGREES
EKG T AXIS: 63 DEGREES
EKG VENTRICULAR RATE: 70 BPM

## 2024-07-30 NOTE — ED NOTES
Attempted to give report on patient's nursing home about his discharge but no one is available to .     Escuadra, Marylee, RN  07/29/24 1102

## 2024-07-30 NOTE — ED NOTES
Patient prepared for and ready to be discharged. Dressed in clothes and given belongings.  IV removed, pt tolerated well, no complications.  Patient discharged at this time in no acute distress after pt verbalized understanding of discharge instructions.   Reviewed medications, and when to return to the ED with patient. Encouraged follow up with PCP  [facility] to take patient back to nursing home.      Escuadra, Marylee, RN  07/30/24 0005

## 2024-07-30 NOTE — DISCHARGE INSTRUCTIONS
You were seen in the emergency department for evaluation of your hypertension.  Our evaluation here did not demonstrate any acute or emergent processes requiring emergent intervention at this time. We feel that it is appropriate for you to return to your care facility at this time.    We recommend that you follow-up with your primary care provider for a blood pressure recheck, given its significant variability seen while in our department.  We recommend you do this within the next week.    Please return to the emergency department if you experience vision changes, hearing changes, nausea, vomiting, significant headache, or for any other concerns you may have.

## 2024-11-15 ENCOUNTER — HOSPITAL ENCOUNTER (EMERGENCY)
Age: 59
Discharge: HOME | End: 2024-11-16
Attending: EMERGENCY MEDICINE
Payer: MEDICAID
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
## 2024-11-15 DIAGNOSIS — S01.511A LIP LACERATION, INITIAL ENCOUNTER: ICD-10-CM

## 2024-11-15 DIAGNOSIS — S00.81XA ABRASION OF FOREHEAD, INITIAL ENCOUNTER: ICD-10-CM

## 2024-11-15 DIAGNOSIS — S46.812A STRAIN OF LEFT TRAPEZIUS MUSCLE, INITIAL ENCOUNTER: ICD-10-CM

## 2024-11-15 PROCEDURE — 12011 RPR F/E/E/N/L/M 2.5 CM/<: CPT

## 2024-11-15 PROCEDURE — 6370000000 HC RX 637 (ALT 250 FOR IP): Performed by: EMERGENCY MEDICINE

## 2024-11-15 PROCEDURE — 99284 EMERGENCY DEPT VISIT MOD MDM: CPT

## 2024-11-15 RX ORDER — ACETAMINOPHEN 500 MG
1000 TABLET ORAL ONCE
Status: COMPLETED | OUTPATIENT
Start: 2024-11-16 | End: 2024-11-15

## 2024-11-15 RX ADMIN — ACETAMINOPHEN 1000 MG: 500 TABLET, FILM COATED ORAL at 23:55

## 2024-11-15 ASSESSMENT — LIFESTYLE VARIABLES
HOW MANY STANDARD DRINKS CONTAINING ALCOHOL DO YOU HAVE ON A TYPICAL DAY: 1 OR 2
HOW OFTEN DO YOU HAVE A DRINK CONTAINING ALCOHOL: MONTHLY OR LESS

## 2024-11-15 ASSESSMENT — PAIN - FUNCTIONAL ASSESSMENT: PAIN_FUNCTIONAL_ASSESSMENT: 0-10

## 2024-11-15 ASSESSMENT — PAIN DESCRIPTION - DESCRIPTORS: DESCRIPTORS: SHARP

## 2024-11-15 ASSESSMENT — PAIN DESCRIPTION - ORIENTATION: ORIENTATION: LEFT

## 2024-11-15 ASSESSMENT — PAIN SCALES - GENERAL: PAINLEVEL_OUTOF10: 6

## 2024-11-15 ASSESSMENT — PAIN DESCRIPTION - LOCATION: LOCATION: NECK

## 2024-11-16 ENCOUNTER — APPOINTMENT (OUTPATIENT)
Dept: CT IMAGING | Age: 59
End: 2024-11-16
Payer: MEDICAID

## 2024-11-16 VITALS
DIASTOLIC BLOOD PRESSURE: 85 MMHG | BODY MASS INDEX: 21.33 KG/M2 | RESPIRATION RATE: 16 BRPM | SYSTOLIC BLOOD PRESSURE: 112 MMHG | TEMPERATURE: 98.7 F | HEART RATE: 98 BPM | HEIGHT: 70 IN | OXYGEN SATURATION: 98 % | WEIGHT: 149 LBS

## 2024-11-16 PROCEDURE — 70450 CT HEAD/BRAIN W/O DYE: CPT

## 2024-11-16 PROCEDURE — 72125 CT NECK SPINE W/O DYE: CPT

## 2024-11-16 NOTE — ED PROVIDER NOTES
THE [facility]  EMERGENCY DEPARTMENT ENCOUNTER          ATTENDING PHYSICIAN NOTE       Date of evaluation: 11/15/2024    Chief Complaint     Fall (Brought in for unwitnessed fall, pt stated he tripped and fell on the ground hit his head and neck, complains of pain on his head, denies loss of consciousness nor use of blood thinners )      History of Present Illness     John Bella is a 59 y.o. male who presents after fall at his facility.  This was reportedly an unwitnessed fall but the patient reports he was conscious throughout, tripped and did not lose consciousness.  Denies any chest pain or syncopal component to it.  Reports that he hit his head when he fell, has some mild headache and pain in the left posterior neck.  Denies any weakness in arms or legs.  Denies any pain in extremities, chest abdomen or back.  Denies any chronic anticoagulation.    ASSESSMENT / PLAN  (MEDICAL DECISION MAKING)     INITIAL VITALS: BP: (!) 130/90, Temp: 98.7 °F (37.1 °C), Pulse: 98, Respirations: 16, SpO2: 98 %      John Bella is a 59 y.o. male who presents after a mechanical fall today.  He has a small laceration on his lip which was repaired in the emergency department.  He has some other scattered abrasions but no gross deformities.  He appears at his neurological baseline.  We did obtain CT head which shows age-related changes, old stroke, microvascular insults, but nothing acute.  No acute fracture in the spine.  He is otherwise moving his extremities and without additional pain or areas of tenderness on tertiary exam.    Is this patient to be included in the SEP-1 core measure? No Exclusion criteria - the patient is NOT to be included for SEP-1 Core Measure due to: Infection is not suspected    Medical Decision Making  Amount and/or Complexity of Data Reviewed  Radiology: ordered.    Risk  OTC drugs.          Clinical Impression     1. Fall, initial encounter    2. Lip laceration, initial encounter    3. Strain  Exam  Constitutional:       Appearance: He is not ill-appearing.   HENT:      Head:      Comments: Abrasion mid forehead, right cheek without gross deformity or instability     Right Ear: External ear normal.      Left Ear: External ear normal.      Nose: Nose normal.      Comments: Nose nontender     Mouth/Throat:      Comments: Laceration right upper lip, involves mucosa, does not involve vermillion border  Eyes:      Pupils: Pupils are equal, round, and reactive to light.   Cardiovascular:      Rate and Rhythm: Normal rate.      Pulses: Normal pulses.   Pulmonary:      Effort: Pulmonary effort is normal. No respiratory distress.      Breath sounds: No wheezing.   Chest:      Chest wall: No tenderness.   Abdominal:      General: Abdomen is flat.      Palpations: Abdomen is soft.      Tenderness: There is no abdominal tenderness. There is no guarding.   Musculoskeletal:         General: No swelling, tenderness or signs of injury. Normal range of motion.      Cervical back: Normal range of motion. Tenderness: left posterior neck along trap.   Skin:     General: Skin is warm.   Neurological:      General: No focal deficit present.      Mental Status: He is alert. Mental status is at baseline.                    Abebe Fraire MD  11/16/24 0104

## 2024-11-16 NOTE — ED TRIAGE NOTES
Brought in for unwitnessed fall, pt stated he tripped and fell on the ground hit his head and neck, complains of pain on his head, denies loss of consciousness nor use of blood thinners

## 2024-11-16 NOTE — DISCHARGE INSTRUCTIONS
Return to emergency department for worsening symptoms or other concerns.  Take Tylenol and ibuprofen for pain at home.  You have what appears to be some strain in the muscles on the left side of your neck, but no fracture.  Tylenol and ibuprofen should help these, would recommend taking these anti-inflammatories on more or less a scheduled basis every 4-6 hours for the next couple of days.  You have a small laceration to your lip which we repaired here with absorbable suture, though should fall out in 4 to 5 days as well on their own.  It is okay if they fall out earlier, you do not need to do anything with it specifically otherwise.

## 2024-11-16 NOTE — ED NOTES
Patient prepared for and ready to be discharged. Dressed in clothes and given belongings.  Patient discharged at this time in no acute distress after pt verbalized understanding of discharge instructions.   Reviewed medications, and when to return to the ED with patient. Encouraged follow [facility].      Escuadra, Marylee, RN  11/16/24 0138

## 2025-06-25 ENCOUNTER — HOSPITAL ENCOUNTER (EMERGENCY)
Age: 60
Discharge: HOME | End: 2025-06-25
Attending: EMERGENCY MEDICINE
Payer: MEDICAID

## 2025-06-25 ENCOUNTER — APPOINTMENT (OUTPATIENT)
Dept: CT IMAGING | Age: 60
End: 2025-06-25
Payer: MEDICAID

## 2025-06-25 VITALS
SYSTOLIC BLOOD PRESSURE: 164 MMHG | OXYGEN SATURATION: 100 % | RESPIRATION RATE: 18 BRPM | HEIGHT: 70 IN | HEART RATE: 54 BPM | TEMPERATURE: 98.6 F | BODY MASS INDEX: 21.38 KG/M2 | DIASTOLIC BLOOD PRESSURE: 92 MMHG
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
## 2025-06-25 DIAGNOSIS — S02.2XXA CLOSED FRACTURE OF NASAL BONE, INITIAL ENCOUNTER: Primary | ICD-10-CM

## 2025-06-25 DIAGNOSIS — S09.93XA FACIAL INJURY, INITIAL ENCOUNTER: ICD-10-CM
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
## 2025-06-25 PROCEDURE — 70450 CT HEAD/BRAIN W/O DYE: CPT

## 2025-06-25 PROCEDURE — 70486 CT MAXILLOFACIAL W/O DYE: CPT

## 2025-06-25 PROCEDURE — 72125 CT NECK SPINE W/O DYE: CPT

## 2025-06-25 PROCEDURE — 6370000000 HC RX 637 (ALT 250 FOR IP): Performed by: EMERGENCY MEDICINE

## 2025-06-25 PROCEDURE — 99284 EMERGENCY DEPT VISIT MOD MDM: CPT

## 2025-06-25 RX ORDER — ACETAMINOPHEN 160 MG
1000 TABLET,DISINTEGRATING ORAL DAILY
COMMUNITY

## 2025-06-25 RX ORDER — DIVALPROEX SODIUM 125 MG/1
125 TABLET, DELAYED RELEASE ORAL 3 TIMES DAILY
COMMUNITY

## 2025-06-25 RX ORDER — NIFEDIPINE 60 MG/1
60 TABLET, EXTENDED RELEASE ORAL DAILY
COMMUNITY

## 2025-06-25 RX ORDER — BISACODYL 5 MG/1
5 TABLET, DELAYED RELEASE ORAL DAILY PRN
COMMUNITY

## 2025-06-25 RX ORDER — MIRTAZAPINE 15 MG/1
15 TABLET, ORALLY DISINTEGRATING ORAL NIGHTLY
COMMUNITY

## 2025-06-25 RX ORDER — BACITRACIN ZINC AND POLYMYXIN B SULFATE 500; 1000 [USP'U]/G; [USP'U]/G
OINTMENT TOPICAL ONCE
Status: COMPLETED | OUTPATIENT
Start: 2025-06-25 | End: 2025-06-25

## 2025-06-25 RX ORDER — OMEPRAZOLE 20 MG/1
20 CAPSULE, DELAYED RELEASE ORAL DAILY
COMMUNITY

## 2025-06-25 RX ORDER — LEVOTHYROXINE SODIUM 75 UG/1
75 TABLET ORAL DAILY
COMMUNITY

## 2025-06-25 RX ORDER — HALOPERIDOL 5 MG/ML
5 INJECTION INTRAMUSCULAR EVERY 6 HOURS PRN
COMMUNITY

## 2025-06-25 RX ADMIN — Medication: at 09:04

## 2025-06-25 ASSESSMENT — PAIN DESCRIPTION - LOCATION: LOCATION: FACE

## 2025-06-25 ASSESSMENT — PAIN - FUNCTIONAL ASSESSMENT
PAIN_FUNCTIONAL_ASSESSMENT: 0-10
PAIN_FUNCTIONAL_ASSESSMENT: ACTIVITIES ARE NOT PREVENTED

## 2025-06-25 ASSESSMENT — PAIN DESCRIPTION - PAIN TYPE: TYPE: ACUTE PAIN

## 2025-06-25 ASSESSMENT — PAIN SCALES - GENERAL: PAINLEVEL_OUTOF10: 6

## 2025-06-25 ASSESSMENT — PAIN DESCRIPTION - DESCRIPTORS: DESCRIPTORS: ACHING

## 2025-06-25 NOTE — DISCHARGE INSTRUCTIONS
As discussed, CT scans showed no evidence of bleeding or bruising on the brain, no injuries to the neck, no obvious facial injuries.  You do have evidence of a nasal bone fracture, although the radiologist cannot tell for certain whether it is new or old.  Please call the ENT office to make a follow-up appointment for further evaluation and management.  Please keep the scrape clean and dry, apply a small amount of antibiotic ointment once or twice per day.  Return to the emergency department if you have increasing pain, new symptoms, or other concerns.

## 2025-06-25 NOTE — ED TRIAGE NOTES
Patient arrived in the ER after a fall. Patient had a unwitnessed fall earlier in the morning. Patient states that he trip over a sheet and hit his face. Denies any LOC or dizziness. Patient is from [facility].

## 2025-06-25 NOTE — ED PROVIDER NOTES
THE [facility]  EMERGENCY DEPARTMENT ENCOUNTER          ATTENDING PHYSICIAN NOTE       Date of evaluation: 6/25/2025    Chief Complaint     Fall      History of Present Illness     John Bella is a 59 y.o. male with a past medical history notable for Warnicke's encephalopathy and additional psychiatric diagnoses, as well as other medical comorbidities, who resides in a long-term care facility, who presents to the emergency department today after a fall.  The patient states that he was getting up out of bed this morning and his foot got tangled in the blanket, and he fell forward, landing on his face.  Denies loss of consciousness.  Complains of pain primarily in the frontal head and face, and particularly in the nose.  States that his nose was bleeding, although that has resolved.  Denies any other sources of pain or injury.  Denies any antecedent medical symptoms, and describes this as a purely mechanical fall.    Review of Systems     Review of Systems   All other systems reviewed and are negative.      Past Medical, Surgical, Family, and Social History     He has no past medical history on file.  He has no past surgical history on file.  His family history is not on file.  He reports that he has never smoked. He has never used smokeless tobacco. He reports that he does not drink alcohol and does not use drugs.    Medications     Discharge Medication List as of 6/25/2025  9:22 AM        CONTINUE these medications which have NOT CHANGED    Details   bisacodyl (DULCOLAX) 5 MG EC tablet Take 1 tablet by mouth daily as needed for ConstipationHistorical Med      divalproex (DEPAKOTE) 125 MG DR tablet Take 1 tablet by mouth 3 times dailyHistorical Med      haloperidol lactate (HALDOL) 5 MG/ML injection Inject 1 mL into the muscle every 6 hours as needed for AgitationHistorical Med      levothyroxine (SYNTHROID) 75 MCG tablet Take 1 tablet by mouth DailyHistorical Med      omeprazole (PRILOSEC) 20 MG delayed